# Patient Record
Sex: FEMALE | Race: WHITE
[De-identification: names, ages, dates, MRNs, and addresses within clinical notes are randomized per-mention and may not be internally consistent; named-entity substitution may affect disease eponyms.]

---

## 2017-12-22 ENCOUNTER — HOSPITAL ENCOUNTER (INPATIENT)
Dept: HOSPITAL 56 - MW.ED | Age: 27
LOS: 4 days | Discharge: HOME | DRG: 364 | End: 2017-12-26
Attending: SURGERY | Admitting: SURGERY
Payer: COMMERCIAL

## 2017-12-22 DIAGNOSIS — K21.9: ICD-10-CM

## 2017-12-22 DIAGNOSIS — Z88.2: ICD-10-CM

## 2017-12-22 DIAGNOSIS — Z88.0: ICD-10-CM

## 2017-12-22 DIAGNOSIS — T81.12XA: ICD-10-CM

## 2017-12-22 DIAGNOSIS — F17.200: ICD-10-CM
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
## 2017-12-22 DIAGNOSIS — Z79.899: ICD-10-CM

## 2017-12-22 DIAGNOSIS — T81.4XXA: ICD-10-CM

## 2017-12-22 DIAGNOSIS — L27.0: ICD-10-CM

## 2017-12-22 DIAGNOSIS — Z86.14: ICD-10-CM

## 2017-12-22 DIAGNOSIS — T36.1X5A: ICD-10-CM

## 2017-12-22 DIAGNOSIS — Z88.8: ICD-10-CM

## 2017-12-22 DIAGNOSIS — F41.9: ICD-10-CM

## 2017-12-22 DIAGNOSIS — L03.314: Primary | ICD-10-CM
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
## 2017-12-22 DIAGNOSIS — E87.1: ICD-10-CM

## 2017-12-22 DIAGNOSIS — L02.214: ICD-10-CM

## 2017-12-22 LAB
CHLORIDE SERPL-SCNC: 106 MMOL/L (ref 98–110)
CHLORIDE SERPL-SCNC: 107 MMOL/L (ref 98–110)
SODIUM SERPL-SCNC: 136 MMOL/L (ref 136–146)
SODIUM SERPL-SCNC: 137 MMOL/L (ref 136–146)

## 2017-12-22 PROCEDURE — 0J9C0ZZ DRAINAGE OF PELVIC REGION SUBCUTANEOUS TISSUE AND FASCIA, OPEN APPROACH: ICD-10-PCS | Performed by: SURGERY

## 2017-12-22 RX ADMIN — FENTANYL CITRATE PRN MCG: 50 INJECTION, SOLUTION INTRAMUSCULAR; INTRAVENOUS at 20:24

## 2017-12-22 RX ADMIN — FENTANYL CITRATE PRN MCG: 50 INJECTION, SOLUTION INTRAMUSCULAR; INTRAVENOUS at 20:33

## 2017-12-22 NOTE — PCM.SN
- Free Text/Narrative


Note: 


Patient developed fever chills and hypotension in the PACU. She was given 

dantrolene, IV tylenol, fentanyl for pain at the incision site, IV fluids and 

transferred to the ICU. Her fever on arrival to the ICU was 104. Her vitals 

improved but she remained mildly tachycardic. After the IV tylenol was begun 

her fever was 103F. I removed the dressings from the wound. Wound edges 

appeared clean. No dishwater fluid or crepitus along the wound edges. Still has 

~2-3 cm of erythema around the area. Wound packed less tightly. Stat labs 

ordered. She is on vancomycin and flagyl. Given her history of MRSA I would 

suspect this is the same bacteria causing her issues. Will follow up on labs 

and continue to monitor closely.

## 2017-12-22 NOTE — PCM.OPNOTE
- General Post-Op/Procedure Note


Date of Surgery/Procedure: 12/22/17


Operative Procedure(s): Incision and drainage left groin abscess


Findings: 


left groin abscess 2 x 2.5 x 2 cm


Pre Op Diagnosis: groin abscess


Post-Op Diagnosis: same


Anesthesia Technique: MAC


Primary Surgeon: Iwona Hernandez


EBL in mLs: 5


Condition: Stable

## 2017-12-22 NOTE — PCM.PREANE
Preanesthetic Assessment





- Anesthesia/Transfusion/Family Hx


Anesthesia History: Prior Anesthesia Without Reaction


Family History of Anesthesia Reaction: No





- Review of Systems


General: No Symptoms


Pulmonary: No Symptoms


Cardiovascular: No Symptoms


Gastrointestinal: No Symptoms


Neurological: No Symptoms


Other: Reports: None





- Physical Assessment


NPO Status Date: 12/22/17


NPO Status Time: 15:00


O2 Sat by Pulse Oximetry: 100


Respiratory Rate: 18


Vital Signs: 





 Last Vital Signs











Temp  99.0 F   12/22/17 17:00


 


Pulse  92   12/22/17 18:01


 


Resp  18   12/22/17 18:01


 


BP  96/47 L  12/22/17 18:01


 


Pulse Ox  100   12/22/17 18:01











Height: 5 ft 9 in


Weight: 57.153 kg


ASA Class: 2E


Mental Status: Alert & Oriented x3


Airway Class: Mallampati = 2


Dentition: Reports: Normal Dentition


Thyro-Mental Finger Breadths: 3


Mouth Opening Finger Breadths: 3


ROM/Head Extension: Full


Lungs: Clear to Auscultation, Normal Respiratory Effort


Cardiovascular: Regular Rate, Regular Rhythm





- Lab


Values: 





 Laboratory Last Values











WBC  9.54 K/uL (4.0-11.0)   12/22/17  14:30    


 


RBC  3.61 M/uL (4.30-5.90)  L  12/22/17  14:30    


 


Hgb  10.7 g/dL (12.0-16.0)  L  12/22/17  14:30    


 


Hct  32.9 % (36.0-46.0)  L  12/22/17  14:30    


 


MCV  91.1 fL (80.0-98.0)   12/22/17  14:30    


 


MCH  29.6 pg (27.0-32.0)   12/22/17  14:30    


 


MCHC  32.5 g/dL (31.0-37.0)   12/22/17  14:30    


 


RDW Std Deviation  50.1 fl (28.0-62.0)   12/22/17  14:30    


 


RDW Coeff of Hung  15 % (11.0-15.0)   12/22/17  14:30    


 


Plt Count  277 K/uL (150-400)   12/22/17  14:30    


 


MPV  10.10 fL (7.40-12.00)   12/22/17  14:30    


 


Neut % (Auto)  72.0 % (48.0-80.0)   12/22/17  14:30    


 


Lymph % (Auto)  19.0 % (16.0-40.0)   12/22/17  14:30    


 


Mono % (Auto)  8.0 % (0.0-15.0)   12/22/17  14:30    


 


Eos % (Auto)  0.8 % (0.0-7.0)   12/22/17  14:30    


 


Baso % (Auto)  0.2 % (0.0-1.5)   12/22/17  14:30    


 


Neut # (Auto)  6.9 K/uL (1.4-5.7)  H  12/22/17  14:30    


 


Lymph # (Auto)  1.8 K/uL (0.6-2.4)   12/22/17  14:30    


 


Mono # (Auto)  0.8 K/uL (0.0-0.8)   12/22/17  14:30    


 


Eos # (Auto)  0.1 K/uL (0.0-0.7)   12/22/17  14:30    


 


Baso # (Auto)  0.0 K/uL (0.0-0.1)   12/22/17  14:30    


 


Nucleated RBC %  0.0 /100WBC  12/22/17  14:30    


 


Nucleated RBCs #  0 K/uL  12/22/17  14:30    


 


Sodium  137 mmol/L (136-146)   12/22/17  14:30    


 


Potassium  3.7 mmol/L (3.5-5.1)   12/22/17  14:30    


 


Chloride  106 mmol/L ()   12/22/17  14:30    


 


Carbon Dioxide  24 mmol/L (21-31)   12/22/17  14:30    


 


BUN  10 mg/dL (6.0-23.0)   12/22/17  14:30    


 


Creatinine  0.7 mg/dL (0.6-1.5)   12/22/17  14:30    


 


Est Cr Clr Drug Dosing  109.88 mL/min  12/22/17  14:30    


 


Estimated GFR (MDRD)  > 60.0 ml/min  12/22/17  14:30    


 


Glucose  98 mg/dL ()   12/22/17  14:30    


 


Calcium  8.6 mg/dL (8.8-10.8)  L  12/22/17  14:30    


 


Total Bilirubin  0.3 mg/dL (0.1-1.5)   12/22/17  14:30    


 


AST  14 IU/L (5-40)   12/22/17  14:30    


 


ALT  10 IU/L (8-54)   12/22/17  14:30    


 


Alkaline Phosphatase  61  ()   12/22/17  14:30    


 


Total Protein  7.8 g/dL (6.0-8.0)   12/22/17  14:30    


 


Albumin  3.3 g/dL (3.5-5.0)  L  12/22/17  14:30    


 


Globulin  4.5 g/dL (2.0-3.5)  H  12/22/17  14:30    


 


Albumin/Globulin Ratio  0.7  (1.3-2.8)  L  12/22/17  14:30    


 


Urine Color  YELLOW   12/22/17  15:30    


 


Urine Appearance  CLEAR   12/22/17  15:30    


 


Urine pH  6.5  (5.0-8.0)   12/22/17  15:30    


 


Ur Specific Gravity  1.025  (1.001-1.035)   12/22/17  15:30    


 


Urine Protein  NEGATIVE mg/dL (NEGATIVE)   12/22/17  15:30    


 


Urine Glucose (UA)  NEGATIVE mg/dL (NEGATIVE)   12/22/17  15:30    


 


Urine Ketones  NEGATIVE mg/dL (NEGATIVE)   12/22/17  15:30    


 


Urine Occult Blood  SMALL  (NEGATIVE)  H  12/22/17  15:30    


 


Urine Nitrite  NEGATIVE  (NEGATIVE)   12/22/17  15:30    


 


Urine Bilirubin  NEGATIVE  (NEGATIVE)   12/22/17  15:30    


 


Urine Urobilinogen  1.0 EU/dL (<2.0)   12/22/17  15:30    


 


Ur Leukocyte Esterase  SMALL  (NEGATIVE)   12/22/17  15:30    


 


Urine RBC  0-2  (0-2/HPF)   12/22/17  15:30    


 


Urine WBC  3-5  (0-5/HPF)   12/22/17  15:30    


 


Ur Epithelial Cells  OCCASIONAL  (NONE-FEW)   12/22/17  15:30    


 


Urine Bacteria  FEW  (NEGATIVE)   12/22/17  15:30    


 


Urine Mucus  LIGHT  (NONE-MOD)   12/22/17  15:30    


 


Urinalysis Comment     12/22/17  15:30    


 


Urine HCG, Qual  NEGATIVE  (NEGATIVE)   12/22/17  15:30    














- Allergies


Allergies/Adverse Reactions: 


 Allergies











Allergy/AdvReac Type Severity Reaction Status Date / Time


 


naproxen Allergy  Rash Verified 12/22/17 14:06


 


Penicillins Allergy  Rash Verified 12/22/17 14:06


 


Sulfa (Sulfonamide Allergy  Difficulty Verified 12/22/17 14:06





Antibiotics)   Breathing  














- Acknowledgements


Anesthesia Type Planned: General Anesthesia, MAC


Pt an Appropriate Candidate for the Planned Anesthesia: Yes


Alternatives and Risks of Anesthesia Discussed w Pt/Guardian: Yes


Pt/Guardian Understands and Agrees with Anesthesia Plan: Yes





PreAnesthesia Questionnaire


HEENT History: Reports: Impaired Vision, Other (See Below)


Other HEENT History: enlarged tonsil


Cardiovascular History: Reports: None


Respiratory History: Reports: Other (See Below) (Smoker)


Gastrointestinal History: Reports: None


Genitourinary History: Reports: UTI, Recurrent


OB/GYN History: Reports: Pregnancy


Other OB/BYN History: Two prior pregnancies. Both children alive and healthy.


Musculoskeletal History: Reports: None


Neurological History: Reports: None


Psychiatric History: Reports: Anxiety


Endocrine/Metabolic History: Reports: None


Hematologic History: Reports: None


Immunologic History: Reports: Other (See Below)


Oncologic (Cancer) History: Reports: None


Dermatologic History: Reports: Other (See Below)


Other Dermatologic History: hand and feet rashes from Naproxen from Lupus





- Infectious Disease History


Infectious Disease History: Reports: Chicken Pox





- Past Surgical History


HEENT Surgical History: Reports: Other (See Below) ([place] teeth extraction)


Dermatological Surgical History: Reports: Skin Biopsy





- SUBSTANCE USE


Smoking Status *Q: Current Every Day Smoker (0.25 PPD)


Tobacco Use Within Last Twelve Months: Cigarettes


Second Hand Smoke Exposure: Yes


Recreational Drug Use History: No





- HOME MEDS


Home Medications: 


 Home Meds





Hydroxychloroquine Sulfate [Plaquenil] 200 mg PO DAILY 12/22/17 [History]











- CURRENT (IN HOUSE) MEDS


Current Meds: 





 Current Medications





Sodium Chloride (Normal Saline)  1,000 mls @ 999 mls/hr IV STAT ONE


   Stop: 12/22/17 19:07





Discontinued Medications





Sodium Chloride (Normal Saline)  1,000 mls @ 999 mls/hr IV STAT ONE


   Stop: 12/22/17 15:21


   Last Admin: 12/22/17 14:45 Dose:  999 mls/hr


Iopamidol (Isovue Multipack-370 (76%))  100 ml IVPUSH ONETIME STA


   Stop: 12/22/17 16:05


   Last Admin: 12/22/17 16:05 Dose:  100 ml


Morphine Sulfate (Morphine)  2 mg IVPUSH ONETIME ONE


   Stop: 12/22/17 14:23


   Last Admin: 12/22/17 14:45 Dose:  2 mg


Morphine Sulfate (Morphine)  2 mg IVPUSH ONETIME ONE


   Stop: 12/22/17 17:37


   Last Admin: 12/22/17 17:56 Dose:  2 mg

## 2017-12-22 NOTE — EDM.PDOC
ED HPI GENERAL MEDICAL PROBLEM





- General


Chief Complaint: General


Stated Complaint: FLUID BUILD UP GROIN


Time Seen by Provider: 12/22/17 13:54


Source of Information: Reports: Patient


History Limitations: Reports: No Limitations





- History of Present Illness


INITIAL COMMENTS - FREE TEXT/NARRATIVE: 


HISTORY AND PHYSICAL:





History of present illness:


Patient is a 27-year-old female who presents to the emergency room today with 

complaints of an unresolved abscess. States she has had a large abscess to the 

left groin crossing onto the mons pubis x 2 weeks. She has seen Dr. Seth who 

placed her on Clindamycin and Bactrim, which she just finished today. She had 

an ultrasound which was performed yesterday. 


US shows an indeterminate 3.51.5 cm soft tissue structure deep to marked 

subcutaneous edema in the left groin. The first sound is recommended performing 

a CT with IV contrast for further evaluation.


The abscess is firm and tender to palpation. Denies any fever or chills. Denies 

any change to her urinary or bowel habits. Denies any chest pain, shortness of 

breath, abdominal pain.


Patient has a past medical history of MRSA vulvitis and lupus. 





Review of systems: 


As per history of present illness and below otherwise all systems reviewed and 

negative.





Past medical history: 


As per history of present illness and as reviewed below otherwise 

noncontributory.





Surgical history: 


As per history of present illness and as reviewed below otherwise 

noncontributory.





Social history: 


No reported history of drug or alcohol abuse.





Family history: 


As per history of present illness and as reviewed below otherwise 

noncontributory.





Physical exam:


HEENT: Atraumatic, normocephalic, pupils reactive, negative for conjunctival 

pallor or scleral icterus, mucous membranes moist, throat clear, neck supple, 

nontender, trachea midline.


Lungs: Clear to auscultation, breath sounds equal bilaterally, chest nontender.


Heart: S1S2, regular rate and rhythm - no overt murmurs


Abdomen: Soft, nondistended, nontender. Negative for masses or 

hepatosplenomegaly. Negative for costovertebral tenderness.


Pelvis: Stable nontender.


Genitourinary: Deferred.


Rectal: Deferred.


Extremities: Atraumatic, negative for cords or calf pain. Neurovascular 

unremarkable.


Skin: Patient has a localized area of redness to the left going crossing onto 

the mons pubis. Does not extend into the genitalia. Tender to palpation. 

Nonfluctuant. No induration. Warm to touch.


Neuro: Awake, alert, oriented. Cranial nerves II through XII unremarkable. 

Cerebellum unremarkable. Motor and sensory unremarkable throughout. Exam 

nonfocal.





1700-Patient is still waiting for CT at this time. She states she is 

comfortable and declines any need for pain medication at this time. Vital signs 

are stable.


1800- CT results have returned. 3.7cm left inguinal peripherally enhancing 

collection, suggestive of an abscess. Underlying enlarged left inguinal lymph 

nodes with partially necrotic right perirectal lymph node. Dr. Hernandez was 

consulted on this case. She states she will come in to see the patient. Patient 

is made aware.


1805- IV fluid and repeat morphine given at this time. Patient has been nothing 

by mouth since with small amount of liquid at 3 PM.


1830- Dr Hernandez is here to see/evaluate patient.


1850- Patient will be admitted for observation for same day surgery, per Dr Hernandez





Diagnostics:


CBC, CMP, BC x 2, CT abdomen/pelvis





Therapeutics:


IV fluids, Morphine





Impression: 


Abscess





Plan:


Observation admission to Same Day Surgery per Dr. Hernandez





Definitive disposition and diagnosis as appropriate pending reevaluation and 

review of above.





  ** Left Groin


Pain Score (Numeric/FACES): 9





- Related Data


 Allergies











Allergy/AdvReac Type Severity Reaction Status Date / Time


 


naproxen Allergy  Rash Verified 12/22/17 14:06


 


Penicillins Allergy  Rash Verified 12/22/17 14:06


 


Sulfa (Sulfonamide Allergy  Difficulty Verified 12/22/17 14:06





Antibiotics)   Breathing  











Home Meds: 


 Home Meds





Hydroxychloroquine Sulfate [Plaquenil] 200 mg PO DAILY 12/22/17 [History]











Past Medical History


HEENT History: Reports: Impaired Vision, Other (See Below)


Other HEENT History: enlarged tonsil


Genitourinary History: Reports: UTI, Recurrent


OB/GYN History: Reports: Pregnancy


Other OB/BYN History: Two prior pregnancies. Both children alive and healthy.


Psychiatric History: Reports: Anxiety


Immunologic History: Reports: Other (See Below)


Dermatologic History: Reports: Other (See Below)


Other Dermatologic History: hand and feet rashes from Naproxen from Lupus





- Infectious Disease History


Infectious Disease History: Reports: Chicken Pox





- Past Surgical History


Dermatological Surgical History: Reports: Skin Biopsy





Social & Family History





- Family History


Family Medical History: Noncontributory


Other Oncologic Family History: Father and maternal grandfather had cancer but 

patient cannot recall which kind.





- Tobacco Use


Smoking Status *Q: Current Every Day Smoker


Years of Tobacco use: 5


Packs/Tins Daily: 0.3


Used Tobacco, but Quit: No


Month Tobacco Last Used: November 2015


Second Hand Smoke Exposure: Yes





- Caffeine Use


Caffeine Use: Reports: Soda


Caffeine Use Comment: 2 cups per day





- Recreational Drug Use


Recreational Drug Use: No


Drug Use in Last 12 Months: Yes





ED ROS GENERAL





- Review of Systems


Review Of Systems: ROS reveals no pertinent complaints other than HPI.





ED EXAM, GENERAL





- Physical Exam


Exam: See Below (See dictation)





Course





- Vital Signs


Last Recorded V/S: 


 Last Vital Signs











Temp  99.0 F   12/22/17 17:00


 


Pulse  92   12/22/17 18:01


 


Resp  18   12/22/17 18:01


 


BP  96/47 L  12/22/17 18:01


 


Pulse Ox  100   12/22/17 18:01














- Orders/Labs/Meds


Orders: 


 Active Orders 24 hr











 Category Date Time Status


 


 Admission Status [Patient Status] [ADT] Stat ADT  12/22/17 18:52 Ordered


 


 Abdomen Pelvis w Cont [CT] Stat Exams  12/22/17 14:20 Taken


 


 CULTURE BLOOD [BC] Stat Lab  12/22/17 14:30 Received


 


 CULTURE BLOOD [BC] Stat Lab  12/22/17 14:34 Received


 


 Sodium Chloride 0.9% [Normal Saline] 1,000 ml Med  12/22/17 18:07 Active





 IV STAT   


 


 Blood Culture x2 Reflex Set [OM.PC] Stat Oth  12/22/17 14:21 Ordered








 Medication Orders





Sodium Chloride (Normal Saline)  1,000 mls @ 999 mls/hr IV STAT ONE


   Stop: 12/22/17 19:07








Labs: 


 Laboratory Tests











  12/22/17 12/22/17 12/22/17 Range/Units





  14:30 14:30 15:30 


 


WBC  9.54    (4.0-11.0)  K/uL


 


RBC  3.61 L    (4.30-5.90)  M/uL


 


Hgb  10.7 L    (12.0-16.0)  g/dL


 


Hct  32.9 L    (36.0-46.0)  %


 


MCV  91.1    (80.0-98.0)  fL


 


MCH  29.6    (27.0-32.0)  pg


 


MCHC  32.5    (31.0-37.0)  g/dL


 


RDW Std Deviation  50.1    (28.0-62.0)  fl


 


RDW Coeff of Hung  15    (11.0-15.0)  %


 


Plt Count  277    (150-400)  K/uL


 


MPV  10.10    (7.40-12.00)  fL


 


Neut % (Auto)  72.0    (48.0-80.0)  %


 


Lymph % (Auto)  19.0    (16.0-40.0)  %


 


Mono % (Auto)  8.0    (0.0-15.0)  %


 


Eos % (Auto)  0.8    (0.0-7.0)  %


 


Baso % (Auto)  0.2    (0.0-1.5)  %


 


Neut # (Auto)  6.9 H    (1.4-5.7)  K/uL


 


Lymph # (Auto)  1.8    (0.6-2.4)  K/uL


 


Mono # (Auto)  0.8    (0.0-0.8)  K/uL


 


Eos # (Auto)  0.1    (0.0-0.7)  K/uL


 


Baso # (Auto)  0.0    (0.0-0.1)  K/uL


 


Nucleated RBC %  0.0    /100WBC


 


Nucleated RBCs #  0    K/uL


 


Sodium   137   (136-146)  mmol/L


 


Potassium   3.7   (3.5-5.1)  mmol/L


 


Chloride   106   ()  mmol/L


 


Carbon Dioxide   24   (21-31)  mmol/L


 


BUN   10   (6.0-23.0)  mg/dL


 


Creatinine   0.7   (0.6-1.5)  mg/dL


 


Est Cr Clr Drug Dosing   109.88   mL/min


 


Estimated GFR (MDRD)   > 60.0   ml/min


 


Glucose   98   ()  mg/dL


 


Calcium   8.6 L   (8.8-10.8)  mg/dL


 


Total Bilirubin   0.3   (0.1-1.5)  mg/dL


 


AST   14   (5-40)  IU/L


 


ALT   10   (8-54)  IU/L


 


Alkaline Phosphatase   61   ()  


 


Total Protein   7.8   (6.0-8.0)  g/dL


 


Albumin   3.3 L   (3.5-5.0)  g/dL


 


Globulin   4.5 H   (2.0-3.5)  g/dL


 


Albumin/Globulin Ratio   0.7 L   (1.3-2.8)  


 


Urine Color     


 


Urine Appearance     


 


Urine pH     (5.0-8.0)  


 


Ur Specific Gravity     (1.001-1.035)  


 


Urine Protein     (NEGATIVE)  mg/dL


 


Urine Glucose (UA)     (NEGATIVE)  mg/dL


 


Urine Ketones     (NEGATIVE)  mg/dL


 


Urine Occult Blood     (NEGATIVE)  


 


Urine Nitrite     (NEGATIVE)  


 


Urine Bilirubin     (NEGATIVE)  


 


Urine Urobilinogen     (<2.0)  EU/dL


 


Ur Leukocyte Esterase     (NEGATIVE)  


 


Urine RBC     (0-2/HPF)  


 


Urine WBC     (0-5/HPF)  


 


Ur Epithelial Cells     (NONE-FEW)  


 


Urine Bacteria     (NEGATIVE)  


 


Urine Mucus     (NONE-MOD)  


 


Urinalysis Comment     


 


Urine HCG, Qual    NEGATIVE  (NEGATIVE)  














  12/22/17 Range/Units





  15:30 


 


WBC   (4.0-11.0)  K/uL


 


RBC   (4.30-5.90)  M/uL


 


Hgb   (12.0-16.0)  g/dL


 


Hct   (36.0-46.0)  %


 


MCV   (80.0-98.0)  fL


 


MCH   (27.0-32.0)  pg


 


MCHC   (31.0-37.0)  g/dL


 


RDW Std Deviation   (28.0-62.0)  fl


 


RDW Coeff of Hung   (11.0-15.0)  %


 


Plt Count   (150-400)  K/uL


 


MPV   (7.40-12.00)  fL


 


Neut % (Auto)   (48.0-80.0)  %


 


Lymph % (Auto)   (16.0-40.0)  %


 


Mono % (Auto)   (0.0-15.0)  %


 


Eos % (Auto)   (0.0-7.0)  %


 


Baso % (Auto)   (0.0-1.5)  %


 


Neut # (Auto)   (1.4-5.7)  K/uL


 


Lymph # (Auto)   (0.6-2.4)  K/uL


 


Mono # (Auto)   (0.0-0.8)  K/uL


 


Eos # (Auto)   (0.0-0.7)  K/uL


 


Baso # (Auto)   (0.0-0.1)  K/uL


 


Nucleated RBC %   /100WBC


 


Nucleated RBCs #   K/uL


 


Sodium   (136-146)  mmol/L


 


Potassium   (3.5-5.1)  mmol/L


 


Chloride   ()  mmol/L


 


Carbon Dioxide   (21-31)  mmol/L


 


BUN   (6.0-23.0)  mg/dL


 


Creatinine   (0.6-1.5)  mg/dL


 


Est Cr Clr Drug Dosing   mL/min


 


Estimated GFR (MDRD)   ml/min


 


Glucose   ()  mg/dL


 


Calcium   (8.8-10.8)  mg/dL


 


Total Bilirubin   (0.1-1.5)  mg/dL


 


AST   (5-40)  IU/L


 


ALT   (8-54)  IU/L


 


Alkaline Phosphatase   ()  


 


Total Protein   (6.0-8.0)  g/dL


 


Albumin   (3.5-5.0)  g/dL


 


Globulin   (2.0-3.5)  g/dL


 


Albumin/Globulin Ratio   (1.3-2.8)  


 


Urine Color  YELLOW  


 


Urine Appearance  CLEAR  


 


Urine pH  6.5  (5.0-8.0)  


 


Ur Specific Gravity  1.025  (1.001-1.035)  


 


Urine Protein  NEGATIVE  (NEGATIVE)  mg/dL


 


Urine Glucose (UA)  NEGATIVE  (NEGATIVE)  mg/dL


 


Urine Ketones  NEGATIVE  (NEGATIVE)  mg/dL


 


Urine Occult Blood  SMALL H  (NEGATIVE)  


 


Urine Nitrite  NEGATIVE  (NEGATIVE)  


 


Urine Bilirubin  NEGATIVE  (NEGATIVE)  


 


Urine Urobilinogen  1.0  (<2.0)  EU/dL


 


Ur Leukocyte Esterase  SMALL  (NEGATIVE)  


 


Urine RBC  0-2  (0-2/HPF)  


 


Urine WBC  3-5  (0-5/HPF)  


 


Ur Epithelial Cells  OCCASIONAL  (NONE-FEW)  


 


Urine Bacteria  FEW  (NEGATIVE)  


 


Urine Mucus  LIGHT  (NONE-MOD)  


 


Urinalysis Comment    


 


Urine HCG, Qual   (NEGATIVE)  











Meds: 


Medications











Generic Name Dose Route Start Last Admin





  Trade Name Freq  PRN Reason Stop Dose Admin


 


Sodium Chloride  1,000 mls @ 999 mls/hr  12/22/17 18:07  





  Normal Saline  IV  12/22/17 19:07  





  STAT ONE   














Discontinued Medications














Generic Name Dose Route Start Last Admin





  Trade Name Freq  PRN Reason Stop Dose Admin


 


Sodium Chloride  1,000 mls @ 999 mls/hr  12/22/17 14:21  12/22/17 14:45





  Normal Saline  IV  12/22/17 15:21  999 mls/hr





  STAT ONE   Administration


 


Iopamidol  100 ml  12/22/17 16:04  12/22/17 16:05





  Isovue Multipack-370 (76%)  IVPUSH  12/22/17 16:05  100 ml





  ONETIME STA   Administration


 


Morphine Sulfate  2 mg  12/22/17 14:22  12/22/17 14:45





  Morphine  IVPUSH  12/22/17 14:23  2 mg





  ONETIME ONE   Administration


 


Morphine Sulfate  2 mg  12/22/17 17:36  12/22/17 17:56





  Morphine  IVPUSH  12/22/17 17:37  2 mg





  ONETIME ONE   Administration














Departure





- Departure


Time of Disposition: 18:57


Disposition: Refer to Observation


Clinical Impression: 


 Abscess








- Discharge Information


Referrals: 


Aurelio eSth MD [Primary Care Provider] - 


Forms:  ED Department Discharge





- My Orders


Last 24 Hours: 


My Active Orders





12/22/17 14:20


Abdomen Pelvis w Cont [CT] Stat 





12/22/17 14:21


Blood Culture x2 Reflex Set [OM.PC] Stat 





12/22/17 14:30


CULTURE BLOOD [BC] Stat 





12/22/17 14:34


CULTURE BLOOD [BC] Stat 





12/22/17 18:07


Sodium Chloride 0.9% [Normal Saline] 1,000 ml IV STAT 





12/22/17 18:52


Admission Status [Patient Status] [ADT] Stat 














- Assessment/Plan


Last 24 Hours: 


My Active Orders





12/22/17 14:20


Abdomen Pelvis w Cont [CT] Stat 





12/22/17 14:21


Blood Culture x2 Reflex Set [OM.PC] Stat 





12/22/17 14:30


CULTURE BLOOD [BC] Stat 





12/22/17 14:34


CULTURE BLOOD [BC] Stat 





12/22/17 18:07


Sodium Chloride 0.9% [Normal Saline] 1,000 ml IV STAT 





12/22/17 18:52


Admission Status [Patient Status] [ADT] Stat

## 2017-12-22 NOTE — PCM.HP
H&P History of Present Illness





- General


Date of Service: 12/22/17


Admit Problem/Dx: 


 Admission Diagnosis/Problem





Admission Diagnosis/Problem      Abscess








Source of Information: Patient


History Limitations: Reports: No Limitations





- History of Present Illness


Initial Comments - Free Text/Narative: 


Patient is a 27 year old female who presented to the ED with 2 week history of 

right inguinal swelling and pain. She was on clindamycin when she developed an 

abscess in the area. She failed outpatient treatment and came to the ED. CT 

showed a ~4 cm abscess in the right groin with lymphadenopathy. She has a 

history of MRSA. 


Onset of Symptoms: Reports: Gradual


Quality: Reports: Ache, Pressure, Sharp, Stabbing


Severity: Severe


Improves with: Reports: None


Worsens with: Reports: Movement


Associated Symptoms: Denies: Fever/Chills, Nausea/Vomiting


  ** Left Groin


Pain Score (Numeric/FACES): 9





- Related Data


Allergies/Adverse Reactions: 


 Allergies











Allergy/AdvReac Type Severity Reaction Status Date / Time


 


naproxen Allergy  Rash Verified 12/22/17 14:06


 


Penicillins Allergy  Rash Verified 12/22/17 14:06


 


Sulfa (Sulfonamide Allergy  Difficulty Verified 12/22/17 14:06





Antibiotics)   Breathing  











Home Medications: 


 Home Meds





Hydroxychloroquine Sulfate [Plaquenil] 200 mg PO DAILY 12/22/17 [History]











Past Medical History


HEENT History: Reports: Impaired Vision, Other (See Below)


Other HEENT History: enlarged tonsil


Cardiovascular History: Reports: None


Respiratory History: Reports: Other (See Below) (Smoker)


Gastrointestinal History: Reports: None


Genitourinary History: Reports: UTI, Recurrent


OB/GYN History: Reports: Pregnancy


Other OB/BYN History: Two prior pregnancies. Both children alive and healthy.


Musculoskeletal History: Reports: None


Neurological History: Reports: None


Psychiatric History: Reports: Anxiety


Endocrine/Metabolic History: Reports: None


Hematologic History: Reports: None


Immunologic History: Reports: Other (See Below)


Oncologic (Cancer) History: Reports: None


Dermatologic History: Reports: Other (See Below)


Other Dermatologic History: hand and feet rashes from Naproxen from Lupus





- Infectious Disease History


Infectious Disease History: Reports: Chicken Pox





- Past Surgical History


HEENT Surgical History: Reports: Other (See Below) ([place] teeth extraction)


Dermatological Surgical History: Reports: Skin Biopsy





Social & Family History





- Family History


Family Medical History: Noncontributory


Other Oncologic Family History: Father and maternal grandfather had cancer but 

patient cannot recall which kind.





- Tobacco Use


Smoking Status *Q: Current Every Day Smoker (0.25 PPD)


Years of Tobacco use: 5


Packs/Tins Daily: 0.3


Used Tobacco, but Quit: No


Month Tobacco Last Used: November 2015


Second Hand Smoke Exposure: Yes





- Caffeine Use


Caffeine Use: Reports: Soda


Caffeine Use Comment: 2 cups per day





- Recreational Drug Use


Recreational Drug Use: No


Drug Use in Last 12 Months: Yes





H&P Review of Systems





- Review of Systems:


Review Of Systems: ROS reveals no pertinent complaints other than HPI.





Exam





- Exam


Exam: See Below





- Vital Signs


Vital Signs: 


 Last Vital Signs











Temp  37.4 C   12/22/17 19:50


 


Pulse  74   12/22/17 20:16


 


Resp  20   12/22/17 20:16


 


BP  88/40 L  12/22/17 20:16


 


Pulse Ox  100   12/22/17 20:16











Weight: 57.153 kg





- Exam


General: Alert, Oriented


Lungs: Clear to Auscultation, Normal Respiratory Effort


Cardiovascular: Regular Rate, Regular Rhythm


GI/Abdominal Exam: Soft, Non-Tender, No Distention, Other (Large fluctuant mass 

in left groin associated with cellulitis. )


Rectal (Female) Exam: Normal Exam


Extremities: Normal Inspection, Normal Range of Motion


Skin: Warm, Dry, Intact





- Patient Data


Lab Results Last 24 hrs: 


 Laboratory Results - last 24 hr











  12/22/17 12/22/17 12/22/17 Range/Units





  14:30 14:30 15:30 


 


WBC  9.54    (4.0-11.0)  K/uL


 


RBC  3.61 L    (4.30-5.90)  M/uL


 


Hgb  10.7 L    (12.0-16.0)  g/dL


 


Hct  32.9 L    (36.0-46.0)  %


 


MCV  91.1    (80.0-98.0)  fL


 


MCH  29.6    (27.0-32.0)  pg


 


MCHC  32.5    (31.0-37.0)  g/dL


 


RDW Std Deviation  50.1    (28.0-62.0)  fl


 


RDW Coeff of Hung  15    (11.0-15.0)  %


 


Plt Count  277    (150-400)  K/uL


 


MPV  10.10    (7.40-12.00)  fL


 


Neut % (Auto)  72.0    (48.0-80.0)  %


 


Lymph % (Auto)  19.0    (16.0-40.0)  %


 


Mono % (Auto)  8.0    (0.0-15.0)  %


 


Eos % (Auto)  0.8    (0.0-7.0)  %


 


Baso % (Auto)  0.2    (0.0-1.5)  %


 


Neut # (Auto)  6.9 H    (1.4-5.7)  K/uL


 


Lymph # (Auto)  1.8    (0.6-2.4)  K/uL


 


Mono # (Auto)  0.8    (0.0-0.8)  K/uL


 


Eos # (Auto)  0.1    (0.0-0.7)  K/uL


 


Baso # (Auto)  0.0    (0.0-0.1)  K/uL


 


Nucleated RBC %  0.0    /100WBC


 


Nucleated RBCs #  0    K/uL


 


Sodium   137   (136-146)  mmol/L


 


Potassium   3.7   (3.5-5.1)  mmol/L


 


Chloride   106   ()  mmol/L


 


Carbon Dioxide   24   (21-31)  mmol/L


 


BUN   10   (6.0-23.0)  mg/dL


 


Creatinine   0.7   (0.6-1.5)  mg/dL


 


Est Cr Clr Drug Dosing   109.88   mL/min


 


Estimated GFR (MDRD)   > 60.0   ml/min


 


Glucose   98   ()  mg/dL


 


Calcium   8.6 L   (8.8-10.8)  mg/dL


 


Total Bilirubin   0.3   (0.1-1.5)  mg/dL


 


AST   14   (5-40)  IU/L


 


ALT   10   (8-54)  IU/L


 


Alkaline Phosphatase   61   ()  


 


Total Protein   7.8   (6.0-8.0)  g/dL


 


Albumin   3.3 L   (3.5-5.0)  g/dL


 


Globulin   4.5 H   (2.0-3.5)  g/dL


 


Albumin/Globulin Ratio   0.7 L   (1.3-2.8)  


 


Urine Color     


 


Urine Appearance     


 


Urine pH     (5.0-8.0)  


 


Ur Specific Gravity     (1.001-1.035)  


 


Urine Protein     (NEGATIVE)  mg/dL


 


Urine Glucose (UA)     (NEGATIVE)  mg/dL


 


Urine Ketones     (NEGATIVE)  mg/dL


 


Urine Occult Blood     (NEGATIVE)  


 


Urine Nitrite     (NEGATIVE)  


 


Urine Bilirubin     (NEGATIVE)  


 


Urine Urobilinogen     (<2.0)  EU/dL


 


Ur Leukocyte Esterase     (NEGATIVE)  


 


Urine RBC     (0-2/HPF)  


 


Urine WBC     (0-5/HPF)  


 


Ur Epithelial Cells     (NONE-FEW)  


 


Urine Bacteria     (NEGATIVE)  


 


Urine Mucus     (NONE-MOD)  


 


Urinalysis Comment     


 


Urine HCG, Qual    NEGATIVE  (NEGATIVE)  














  12/22/17 Range/Units





  15:30 


 


WBC   (4.0-11.0)  K/uL


 


RBC   (4.30-5.90)  M/uL


 


Hgb   (12.0-16.0)  g/dL


 


Hct   (36.0-46.0)  %


 


MCV   (80.0-98.0)  fL


 


MCH   (27.0-32.0)  pg


 


MCHC   (31.0-37.0)  g/dL


 


RDW Std Deviation   (28.0-62.0)  fl


 


RDW Coeff of Hung   (11.0-15.0)  %


 


Plt Count   (150-400)  K/uL


 


MPV   (7.40-12.00)  fL


 


Neut % (Auto)   (48.0-80.0)  %


 


Lymph % (Auto)   (16.0-40.0)  %


 


Mono % (Auto)   (0.0-15.0)  %


 


Eos % (Auto)   (0.0-7.0)  %


 


Baso % (Auto)   (0.0-1.5)  %


 


Neut # (Auto)   (1.4-5.7)  K/uL


 


Lymph # (Auto)   (0.6-2.4)  K/uL


 


Mono # (Auto)   (0.0-0.8)  K/uL


 


Eos # (Auto)   (0.0-0.7)  K/uL


 


Baso # (Auto)   (0.0-0.1)  K/uL


 


Nucleated RBC %   /100WBC


 


Nucleated RBCs #   K/uL


 


Sodium   (136-146)  mmol/L


 


Potassium   (3.5-5.1)  mmol/L


 


Chloride   ()  mmol/L


 


Carbon Dioxide   (21-31)  mmol/L


 


BUN   (6.0-23.0)  mg/dL


 


Creatinine   (0.6-1.5)  mg/dL


 


Est Cr Clr Drug Dosing   mL/min


 


Estimated GFR (MDRD)   ml/min


 


Glucose   ()  mg/dL


 


Calcium   (8.8-10.8)  mg/dL


 


Total Bilirubin   (0.1-1.5)  mg/dL


 


AST   (5-40)  IU/L


 


ALT   (8-54)  IU/L


 


Alkaline Phosphatase   ()  


 


Total Protein   (6.0-8.0)  g/dL


 


Albumin   (3.5-5.0)  g/dL


 


Globulin   (2.0-3.5)  g/dL


 


Albumin/Globulin Ratio   (1.3-2.8)  


 


Urine Color  YELLOW  


 


Urine Appearance  CLEAR  


 


Urine pH  6.5  (5.0-8.0)  


 


Ur Specific Gravity  1.025  (1.001-1.035)  


 


Urine Protein  NEGATIVE  (NEGATIVE)  mg/dL


 


Urine Glucose (UA)  NEGATIVE  (NEGATIVE)  mg/dL


 


Urine Ketones  NEGATIVE  (NEGATIVE)  mg/dL


 


Urine Occult Blood  SMALL H  (NEGATIVE)  


 


Urine Nitrite  NEGATIVE  (NEGATIVE)  


 


Urine Bilirubin  NEGATIVE  (NEGATIVE)  


 


Urine Urobilinogen  1.0  (<2.0)  EU/dL


 


Ur Leukocyte Esterase  SMALL  (NEGATIVE)  


 


Urine RBC  0-2  (0-2/HPF)  


 


Urine WBC  3-5  (0-5/HPF)  


 


Ur Epithelial Cells  OCCASIONAL  (NONE-FEW)  


 


Urine Bacteria  FEW  (NEGATIVE)  


 


Urine Mucus  LIGHT  (NONE-MOD)  


 


Urinalysis Comment    


 


Urine HCG, Qual   (NEGATIVE)  











Result Diagrams: 


 12/22/17 14:30





 12/22/17 14:30





*Q Meaningful Use (ADM)





- VTE *Q


VTE Criteria *Q: 








- Stroke *Q


Stroke Criteria *Q: 








- AMI *Q


AMI Criteria *Q: 








- Problem List


(1) Cellulitis


SNOMED Code(s): 532247350


   ICD Code: L03.90 - CELLULITIS, UNSPECIFIED   Status: Acute   Current Visit: 

Yes   





(2) Abscess


SNOMED Code(s): 164092876


   ICD Code: L02.91 - CUTANEOUS ABSCESS, UNSPECIFIED   Status: Acute   Current 

Visit: Yes   


Problem List Initiated/Reviewed/Updated: Yes


Orders Last 24hrs: 


 Active Orders 24 hr











 Category Date Time Status


 


 Patient Status [ADT] Routine ADT  12/22/17 19:58 Active


 


 Bradycardia-Neuroaxis [employer] [RC] ROUTINE Care  12/22/17 20:21 Active


 


 Communication Order [RC] PER UNIT ROUTINE Care  12/22/17 20:05 Active


 


 Hypertension-Neuroaxis [employer] [RC] ROUTINE Care  12/22/17 20:21 Active


 


 Hypotension-Neuroaxis [employer] [RC] ROUTINE Care  12/22/17 20:21 Active


 


 Intake and Output [RC] QSHIFT Care  12/22/17 19:59 Active


 


 Oxygen Therapy [RC] PRN Care  12/22/17 19:58 Active


 


 RT Incentive Spirometry [RC] ASDIRECTED Care  12/22/17 19:58 Active


 


 Up ad Joie [RC] ASDIRECTED Care  12/22/17 19:58 Active


 


 Vital Signs [RC] PER UNIT ROUTINE Care  12/22/17 19:58 Active


 


 Regular Diet [DIET] Diet  12/22/17 Breakfast Active


 


 Abdomen Pelvis w Cont [CT] Stat Exams  12/22/17 14:20 Taken


 


 CBC W/O DIFF,HEMOGRAM [HEME] AM Lab  12/23/17 05:11 Ordered


 


 CULTURE ANAEROBIC [RM] Routine Lab  12/22/17 19:37 Received


 


 CULTURE BLOOD [BC] Stat Lab  12/22/17 14:30 Received


 


 CULTURE BLOOD [BC] Stat Lab  12/22/17 14:34 Received


 


 CULTURE WOUND [RM] Routine Lab  12/22/17 19:37 Received


 


 GRAM STAIN [RM] Routine Lab  12/22/17 19:37 Received


 


 Acetaminophen/HYDROcodone [Norco 325-5 MG] Med  12/22/17 19:58 Active





 2 tab PO Q4H PRN   


 


 HYDROmorphone [Dilaudid] Med  12/22/17 19:58 Active





 0.5 mg IVPUSH Q1H PRN   


 


 Lactated Ringers [Ringers, Lactated] 1,000 ml Med  12/22/17 19:00 Active





 IV ASDIRECTED   


 


 Ondansetron [Zofran] Med  12/22/17 19:58 Active





 4 mg IVPUSH Q6H PRN   


 


 fentaNYL [Sublimaze] Med  12/22/17 20:21 Ordered





 50 mcg IVPUSH Q5M PRN   


 


 Blood Culture x2 Reflex Set [OM.PC] Stat Oth  12/22/17 14:21 Ordered


 


 Resuscitation Status Routine Resus Stat  12/22/17 19:58 Ordered








 Medication Orders





Hydrocodone Bitart/Acetaminophen (Norco 325-5 Mg)  2 tab PO Q4H PRN


   PRN Reason: Pain (moderate 4-6)


Fentanyl (Sublimaze)  50 mcg IVPUSH Q5M PRN


   PRN Reason: Pain (severe 7-10)


   Stop: 12/23/17 20:21


Hydromorphone HCl (Dilaudid)  0.5 mg IVPUSH Q1H PRN


   PRN Reason: Pain (severe 7-10)


Lactated Ringer's (Ringers, Lactated)  1,000 mls @ 150 mls/hr IV ASDIRECTED MANUELA


   Last Admin: 12/22/17 19:20  Dose: 150 mls/hr


Ondansetron HCl (Zofran)  4 mg IVPUSH Q6H PRN


   PRN Reason: Nausea/Vomiting








Assessment/Plan Comment:: 


We discussed the need for an incision and drainage of this abscess. It is too 

large and too painful to drain in the ED. We discussed the procedure, expected 

breana-operative course and risks including bleeding infection or damage to 

surrounding structures. She verbalized understanding and wishes to proceed. Her 

rectal exam was normal. CT showed a breana-rectal lymph node that was partially 

necrotic. I will treat her overnight with antibiotics. I feel her 

lymphadenopathy is all reactive. IV antibiotics overnight.

## 2017-12-23 LAB
CHLORIDE SERPL-SCNC: 108 MMOL/L (ref 98–110)
CHLORIDE SERPL-SCNC: 111 MMOL/L (ref 98–110)
SODIUM SERPL-SCNC: 137 MMOL/L (ref 136–146)
SODIUM SERPL-SCNC: 138 MMOL/L (ref 136–146)

## 2017-12-23 PROCEDURE — 05HM33Z INSERTION OF INFUSION DEVICE INTO RIGHT INTERNAL JUGULAR VEIN, PERCUTANEOUS APPROACH: ICD-10-PCS | Performed by: SURGERY

## 2017-12-23 RX ADMIN — HYDROCORTISONE SODIUM SUCCINATE SCH MG: 100 INJECTION, POWDER, FOR SOLUTION INTRAMUSCULAR; INTRAVENOUS at 13:48

## 2017-12-23 RX ADMIN — HYDROCODONE BITARTRATE AND ACETAMINOPHEN PRN TAB: 5; 325 TABLET ORAL at 19:28

## 2017-12-23 RX ADMIN — HYDROCODONE BITARTRATE AND ACETAMINOPHEN PRN TAB: 5; 325 TABLET ORAL at 01:45

## 2017-12-23 RX ADMIN — HYDROCODONE BITARTRATE AND ACETAMINOPHEN PRN TAB: 5; 325 TABLET ORAL at 10:11

## 2017-12-23 RX ADMIN — HYDROCODONE BITARTRATE AND ACETAMINOPHEN PRN TAB: 5; 325 TABLET ORAL at 23:38

## 2017-12-23 RX ADMIN — HYDROCORTISONE SODIUM SUCCINATE SCH MG: 100 INJECTION, POWDER, FOR SOLUTION INTRAMUSCULAR; INTRAVENOUS at 06:21

## 2017-12-23 RX ADMIN — HYDROCORTISONE SODIUM SUCCINATE SCH MG: 100 INJECTION, POWDER, FOR SOLUTION INTRAMUSCULAR; INTRAVENOUS at 21:29

## 2017-12-23 RX ADMIN — HYDROCODONE BITARTRATE AND ACETAMINOPHEN PRN TAB: 5; 325 TABLET ORAL at 14:38

## 2017-12-23 RX ADMIN — ONDANSETRON PRN MG: 2 INJECTION, SOLUTION INTRAMUSCULAR; INTRAVENOUS at 22:14

## 2017-12-23 RX ADMIN — ONDANSETRON PRN MG: 2 INJECTION, SOLUTION INTRAMUSCULAR; INTRAVENOUS at 01:42

## 2017-12-23 NOTE — PCM.SURGPN
- General Info


Date of Service: 12/23/17


Date of Surgery/Procedure: 12/22/17


POD#: 1


Post-Op Diagnosis: Left groin abscess


Functional Status: Reports: Other (Patient went into septic shock 

postoperatively. She was initially responding to fluid boluses then this 

morning her BP dropped to 65/22. A central line was placed. Vasocpressin and 

Norepineiphrine drips were given. Her heart rate and blood pressure improved. 

Her antibiotics were broadened. )





- Review of Systems


General: Reports: Fever, Fatigue, Malaise, Chills, Night Sweats.  Denies: 

Appetite


Pulmonary: Reports: No Symptoms


Cardiovascular: Reports: No Symptoms


Gastrointestinal: Reports: No Symptoms


Skin: Reports: Other (Erytehamatous all over. )





- Patient Data


Vitals - Most Recent: 


 Last Vital Signs











Temp  37.2 C   12/23/17 04:00


 


Pulse  125 H  12/22/17 22:15


 


Resp  22 H  12/23/17 07:00


 


BP  74/30 L  12/23/17 07:00


 


Pulse Ox  97   12/23/17 07:00











Weight - Most Recent: 60 kg


I&O - Last 24 Hours: 


 Intake & Output











 12/22/17 12/23/17 12/23/17





 22:59 06:59 14:59


 


Intake Total  1070 


 


Output Total  750 


 


Balance  320 











Lab Results Last 24 Hrs: 


 Laboratory Results - last 24 hr











  12/23/17 12/23/17 12/23/17 Range/Units





  05:36 05:36 08:00 


 


WBC  7.59    (4.0-11.0)  K/uL


 


RBC  3.37 L    (4.30-5.90)  M/uL


 


Hgb  9.8 L    (12.0-16.0)  g/dL


 


Hct  30.8 L    (36.0-46.0)  %


 


MCV  91.4    (80.0-98.0)  fL


 


MCH  29.1    (27.0-32.0)  pg


 


MCHC  31.8    (31.0-37.0)  g/dL


 


RDW Std Deviation  50.5    (28.0-62.0)  fl


 


RDW Coeff of Hung  15    (11.0-15.0)  %


 


Plt Count  199    (150-400)  K/uL


 


MPV  10.40    (7.40-12.00)  fL


 


Neut % (Auto)  96.8 H    (48.0-80.0)  %


 


Lymph % (Auto)  1.1 L    (16.0-40.0)  %


 


Mono % (Auto)  1.8    (0.0-15.0)  %


 


Eos % (Auto)  0.3    (0.0-7.0)  %


 


Baso % (Auto)  0.0    (0.0-1.5)  %


 


Neut # (Auto)  7.4 H    (1.4-5.7)  K/uL


 


Lymph # (Auto)  0.1 L    (0.6-2.4)  K/uL


 


Mono # (Auto)  0.1    (0.0-0.8)  K/uL


 


Eos # (Auto)  0.0    (0.0-0.7)  K/uL


 


Baso # (Auto)  0.0    (0.0-0.1)  K/uL


 


Nucleated RBC %  0.0    /100WBC


 


Nucleated RBCs #  0    K/uL


 


ABG pH     (7.35-7.45)  


 


ABG pCO2     (35-45)  mmHG


 


ABG pO2     ()  mmHG


 


ABG HCO3     (22-26)  mEq/L


 


ABG Total CO2     


 


ABG Base Excess     (-2.0-2.0)  


 


Lactate    1.4  (0.20-2.00)  mmol/L


 


Sodium   138   (136-146)  mmol/L


 


Potassium   3.7   (3.5-5.1)  mmol/L


 


Chloride   111 H   ()  mmol/L


 


Carbon Dioxide   21   (21-31)  mmol/L


 


BUN   9   (6.0-23.0)  mg/dL


 


Creatinine   1.0   (0.6-1.5)  mg/dL


 


Est Cr Clr Drug Dosing   80.75   mL/min


 


Estimated GFR (MDRD)   > 60.0   ml/min


 


Glucose   110   ()  mg/dL


 


Calcium   7.9 L   (8.8-10.8)  mg/dL














  12/23/17 Range/Units





  08:00 


 


WBC   (4.0-11.0)  K/uL


 


RBC   (4.30-5.90)  M/uL


 


Hgb   (12.0-16.0)  g/dL


 


Hct   (36.0-46.0)  %


 


MCV   (80.0-98.0)  fL


 


MCH   (27.0-32.0)  pg


 


MCHC   (31.0-37.0)  g/dL


 


RDW Std Deviation   (28.0-62.0)  fl


 


RDW Coeff of Hung   (11.0-15.0)  %


 


Plt Count   (150-400)  K/uL


 


MPV   (7.40-12.00)  fL


 


Neut % (Auto)   (48.0-80.0)  %


 


Lymph % (Auto)   (16.0-40.0)  %


 


Mono % (Auto)   (0.0-15.0)  %


 


Eos % (Auto)   (0.0-7.0)  %


 


Baso % (Auto)   (0.0-1.5)  %


 


Neut # (Auto)   (1.4-5.7)  K/uL


 


Lymph # (Auto)   (0.6-2.4)  K/uL


 


Mono # (Auto)   (0.0-0.8)  K/uL


 


Eos # (Auto)   (0.0-0.7)  K/uL


 


Baso # (Auto)   (0.0-0.1)  K/uL


 


Nucleated RBC %   /100WBC


 


Nucleated RBCs #   K/uL


 


ABG pH  7.429  (7.35-7.45)  


 


ABG pCO2  32 L  (35-45)  mmHG


 


ABG pO2  73 L  ()  mmHG


 


ABG HCO3  21 L  (22-26)  mEq/L


 


ABG Total CO2  20.1  


 


ABG Base Excess  -2.5 L  (-2.0-2.0)  


 


Lactate   (0.20-2.00)  mmol/L


 


Sodium   (136-146)  mmol/L


 


Potassium   (3.5-5.1)  mmol/L


 


Chloride   ()  mmol/L


 


Carbon Dioxide   (21-31)  mmol/L


 


BUN   (6.0-23.0)  mg/dL


 


Creatinine   (0.6-1.5)  mg/dL


 


Est Cr Clr Drug Dosing   mL/min


 


Estimated GFR (MDRD)   ml/min


 


Glucose   ()  mg/dL


 


Calcium   (8.8-10.8)  mg/dL











Med Orders - Current: 


 Current Medications





Acetaminophen (Tylenol)  325 mg PO Q4H PRN


   PRN Reason: Fever


Hydrocodone Bitart/Acetaminophen (Norco 325-5 Mg)  2 tab PO Q4H PRN


   PRN Reason: Pain (moderate 4-6)


   Last Admin: 12/23/17 01:45 Dose:  2 tab


Cyclobenzaprine HCl (Flexeril)  5 mg PO BID PRN


   PRN Reason: Abdominal Pain


Diphenhydramine HCl (Benadryl)  50 mg IVPUSH Q4H PRN


   PRN Reason: Itching


Fentanyl (Sublimaze)  50 mcg IVPUSH Q5M PRN


   PRN Reason: Pain (severe 7-10)


   Stop: 12/23/17 20:21


   Last Admin: 12/22/17 20:33 Dose:  50 mcg


Hydrocortisone Sodium Succinate (Solu-Cortef)  100 mg IVPUSH Q8H MANUELA


   Last Admin: 12/23/17 06:21 Dose:  100 mg


Hydromorphone HCl (Dilaudid)  0.5 mg IVPUSH Q1H PRN


   PRN Reason: Pain (severe 7-10)


Cefuroxime Sodium 1.5 gm/ (Sodium Chloride)  50 mls @ 100 mls/hr IV Q8H MANUELA


   Last Admin: 12/23/17 02:14 Dose:  100 mls/hr


Vancomycin HCl 1 gm/ Sodium (Chloride)  250 mls @ 166 mls/hr IV Q8H MANUELA


Norepinephrine Bitartrate (Norepinephr-0.9% Nacl 4 Mg/250)  4 mg in 250 mls @ 

7.5 mls/hr IV TITRATE MANUELA; 2 MCG/MIN


   PRN Reason: Protocol


Vasopressin 100 units/ Sodium (Chloride)  100 mls @ 0.6 mls/hr IV TITRATE MANUELA; 

0.01 UNITS/MIN


   PRN Reason: Protocol


Ondansetron HCl (Zofran)  4 mg IVPUSH Q6H PRN


   PRN Reason: Nausea/Vomiting


   Last Admin: 12/23/17 01:42 Dose:  4 mg





Discontinued Medications





Bupivacaine HCl (Sensorcaine-Mpf 0.5%) Confirm Administered Dose 10 ml .ROUTE 

.STK-MED ONE


   Stop: 12/22/17 19:08


Diphenhydramine HCl (Benadryl)  25 mg IVPUSH ONETIME ONE


   Stop: 12/22/17 20:29


   Last Admin: 12/22/17 20:38 Dose:  25 mg


Diphenhydramine HCl (Benadryl) Confirm Administered Dose 50 mg .ROUTE .STK-MED 

ONE


   Stop: 12/22/17 20:38


   Last Admin: 12/22/17 22:14 Dose:  Not Given


Factor IX (Pha) (Kcentra)  0 unit IV ONETIME ONE


   Stop: 12/23/17 03:31


   Last Admin: 12/23/17 03:54 Dose:  Not Given


Fentanyl (Sublimaze) Confirm Administered Dose 100 mcg .ROUTE .STK-MED ONE


   Stop: 12/22/17 19:08


Fentanyl (Sublimaze) Confirm Administered Dose 100 mcg .ROUTE .STK-MED ONE


   Stop: 12/22/17 19:34


Hydromorphone HCl (Dilaudid)  0.5 mg IVPUSH Q1H PRN


   PRN Reason: Pain (severe 7-10)


   Last Admin: 12/22/17 21:57 Dose:  0.5 mg


Sodium Chloride (Normal Saline)  1,000 mls @ 999 mls/hr IV STAT ONE


   Stop: 12/22/17 15:21


   Last Admin: 12/22/17 14:45 Dose:  999 mls/hr


Sodium Chloride (Normal Saline)  1,000 mls @ 999 mls/hr IV STAT ONE


   Stop: 12/22/17 19:07


   Last Admin: 12/22/17 19:34 Dose:  Not Given


Metronidazole 500 mg/ Premix  100 mls @ 100 mls/hr IV ONETIME ONE


   Stop: 12/22/17 20:06


   Last Admin: 12/22/17 19:18 Dose:  100 mls/hr


Vancomycin HCl 1 gm/ Sodium (Chloride)  250 mls @ 250 mls/hr IV ONETIME ONE


   Stop: 12/22/17 20:06


   Last Admin: 12/22/17 22:13 Dose:  Not Given


Lactated Ringer's (Ringers, Lactated)  1,000 mls @ 150 mls/hr IV ASDIRECTED [facility]


Sodium Chloride (Normal Saline) Confirm Administered Dose 250 mls @ as directed 

.ROUTE .STK-MED ONE


   Stop: 12/22/17 19:37


   Last Admin: 12/22/17 22:14 Dose:  Not Given


Lactated Ringer's (Ringers, Lactated)  1,000 mls @ 150 mls/hr IV ASDIRECTED MANUELA


   Last Admin: 12/22/17 23:10 Dose:  150 mls/hr


Vancomycin HCl 0.75 gm/ Sodium (Chloride)  250 mls @ 166 mls/hr IV Q12H MANUELA


Metronidazole 250 mg/ Premix  50 mls @ 50 mls/hr IV QID MANUELA


   Last Admin: 12/22/17 23:46 Dose:  50 mls/hr


Acetaminophen 1,000 mg/ Premix  100 mls @ 400 mls/hr IV NOW ONE


   Stop: 12/22/17 21:19


   Last Admin: 12/22/17 22:15 Dose:  Not Given


Acetaminophen (Ofirmev) Confirm Administered Dose 100 mls @ as directed IV .STK-

MED ONE


   Stop: 12/22/17 21:15


   Last Admin: 12/22/17 22:15 Dose:  Not Given


Lactated Ringer's (Ringers, Lactated)  1,000 mls @ 1,000 mls/hr IV .BOLUS ONE


   Stop: 12/22/17 22:49


   Last Admin: 12/22/17 22:17 Dose:  1,000 mls/hr


Lactated Ringer's (Ringers, Lactated)  1,000 mls @ 999 mls/hr IV .BOLUS ONE


   Stop: 12/23/17 01:46


   Last Admin: 12/23/17 00:51 Dose:  999 mls/hr


Cefuroxime Sodium 1.5 gm/ (Sodium Chloride)  50 mls @ 100 mls/hr IV Q8HR MANUELA


Potassium Chloride 40 meq/ (Sodium Chloride)  500 mls @ 125 mls/hr IV ONETIME 

ONE


   Stop: 12/23/17 07:59


   Last Admin: 12/23/17 04:49 Dose:  125 mls/hr


Lactated Ringer's (Ringers, Lactated)  1,000 mls @ 999 mls/hr IV .BOLUS ONE


   Stop: 12/23/17 06:55


   Last Admin: 12/23/17 06:21 Dose:  999 mls/hr


Norepinephrine Bitartrate (Norepinephr-0.9% Nacl 4 Mg/250) Confirm Administered 

Dose 4 mg in 250 mls @ as directed IV .STK-MED ONE


   Stop: 12/23/17 07:56


Iopamidol (Isovue Multipack-370 (76%))  100 ml IVPUSH ONETIME STA


   Stop: 12/22/17 16:05


   Last Admin: 12/22/17 16:05 Dose:  100 ml


Ketorolac Tromethamine (Toradol)  15 mg IVPUSH Q6H PRN


   PRN Reason: Abdominal Pain


   Stop: 12/27/17 20:29


Lidocaine (Xylocaine-Mpf 2%) Confirm Administered Dose 5 ml .ROUTE .STK-MED ONE


   Stop: 12/22/17 19:07


Meperidine HCl (Demerol)  25 mg IVPUSH ONETIME ONE


   Stop: 12/22/17 20:50


   Last Admin: 12/22/17 20:55 Dose:  25 mg


Meperidine HCl (Demerol) Confirm Administered Dose 25 mg .ROUTE .STK-MED ONE


   Stop: 12/22/17 20:51


   Last Admin: 12/22/17 22:14 Dose:  Not Given


Midazolam HCl (Versed 1 Mg/Ml) Confirm Administered Dose 2 mg .ROUTE .STK-MED 

ONE


   Stop: 12/22/17 19:08


Morphine Sulfate (Morphine)  2 mg IVPUSH ONETIME ONE


   Stop: 12/22/17 14:23


   Last Admin: 12/22/17 14:45 Dose:  2 mg


Morphine Sulfate (Morphine)  2 mg IVPUSH ONETIME ONE


   Stop: 12/22/17 17:37


   Last Admin: 12/22/17 17:56 Dose:  2 mg


Propofol (Diprivan  20 Ml) Confirm Administered Dose 400 mg .ROUTE .STK-MED ONE


   Stop: 12/22/17 19:08


Propofol (Diprivan  20 Ml) Confirm Administered Dose 200 mg .ROUTE .STK-MED ONE


   Stop: 12/22/17 19:43


Vancomycin HCl (Vancocin) Confirm Administered Dose 1 gm .ROUTE .STK-MED ONE


   Stop: 12/22/17 19:34


   Last Admin: 12/22/17 22:14 Dose:  Not Given











- Exam


Wound/Incisions: Other (Wound appears dark red with no evidence of dishwater 

fluid or necrotic tissue. Cellulitis has resolved)


General: Alert, Oriented, Mild Distress


Neck: Supple, Trachea Midline, No JVD


Lungs: Normal Respiratory Effort


Cardiovascular: Regular Rate, Regular Rhythm


GI/Abdominal Exam: Soft, Non-Tender, No Distention


Extremities: Normal Inspection


Skin: Warm, Dry, Intact


Neurological: No New Focal Deficit





- Problem List & Annotations


(1) Cellulitis


SNOMED Code(s): 537697739


   Code(s): L03.90 - CELLULITIS, UNSPECIFIED   Status: Acute   Current Visit: 

Yes   





(2) Abscess


SNOMED Code(s): 440152613


   Code(s): L02.91 - CUTANEOUS ABSCESS, UNSPECIFIED   Status: Acute   Current 

Visit: Yes   





(3) Septic shock


SNOMED Code(s): 64540821


   Code(s): A41.9 - SEPSIS, UNSPECIFIED ORGANISM; R65.21 - SEVERE SEPSIS WITH 

SEPTIC SHOCK   Status: Acute   Current Visit: Yes   





- Problem List Review


Problem List Initiated/Reviewed/Updated: Yes





- My Orders


Last 24 Hours: 


 Active Orders 24 hr











 Category Date Time Status


 


 Verify Patient Consent Obtain [RC] ASDIRECTED Care  12/23/17 07:41 Active


 


 Chest 1V Frontal [CR] Stat Exams  12/23/17 08:13 Ordered


 


 VANCOMYCIN TROUGH [CHEM] Timed Lab  12/24/17 15:30 Ordered


 


 Cefuroxime [Zinacef] 1.5 gm Med  12/23/17 02:00 Active





 Sodium Chloride 0.9% [Normal Saline] 50 ml   





 IV Q8H   


 


 HYDROmorphone [Dilaudid] Med  12/23/17 08:15 Active





 0.5 mg IVPUSH Q1H PRN   


 


 Hydrocortisone Sod Succinate [Solu-CORTEF] Med  12/23/17 06:00 Active





 100 mg IVPUSH Q8H   


 


 Norepinephrine Bit/0.9 % NaCl [Norepinephr-0.9% NaCl 4 Med  12/23/17 07:45 

Active





 mg/250]   





 4 mg in 250 ml IV TITRATE   


 


 Vancomycin [Vancocin] 1 gm Med  12/23/17 08:00 Active





 Sodium Chloride 0.9% [Normal Saline] 250 ml   





 IV Q8H   


 


 Vasopressin 100 units Med  12/23/17 08:30 Active





 Sodium Chloride 0.9% [Normal Saline] 95 ml   





 IV TITRATE   








 Medication Orders





Acetaminophen (Tylenol)  325 mg PO Q4H PRN


   PRN Reason: Fever


Hydrocodone Bitart/Acetaminophen (Norco 325-5 Mg)  2 tab PO Q4H PRN


   PRN Reason: Pain (moderate 4-6)


   Last Admin: 12/23/17 01:45  Dose: 2 tab


Cyclobenzaprine HCl (Flexeril)  5 mg PO BID PRN


   PRN Reason: Abdominal Pain


Diphenhydramine HCl (Benadryl)  50 mg IVPUSH Q4H PRN


   PRN Reason: Itching


Fentanyl (Sublimaze)  50 mcg IVPUSH Q5M PRN


   PRN Reason: Pain (severe 7-10)


   Stop: 12/23/17 20:21


   Last Admin: 12/22/17 20:33  Dose: 50 mcg


   Admin: 12/22/17 20:24  Dose: 50 mcg


Hydrocortisone Sodium Succinate (Solu-Cortef)  100 mg IVPUSH Q8H MANUELA


   Last Admin: 12/23/17 06:21  Dose: 100 mg


Hydromorphone HCl (Dilaudid)  0.5 mg IVPUSH Q1H PRN


   PRN Reason: Pain (severe 7-10)


Cefuroxime Sodium 1.5 gm/ (Sodium Chloride)  50 mls @ 100 mls/hr IV Q8H MANUELA


   Last Admin: 12/23/17 02:14  Dose: 100 mls/hr


Vancomycin HCl 1 gm/ Sodium (Chloride)  250 mls @ 166 mls/hr IV Q8H MANUELA


Norepinephrine Bitartrate (Norepinephr-0.9% Nacl 4 Mg/250)  4 mg in 250 mls @ 

7.5 mls/hr IV TITRATE MANUELA; 2 MCG/MIN


   PRN Reason: Protocol


Vasopressin 100 units/ Sodium (Chloride)  100 mls @ 0.6 mls/hr IV TITRATE MANUELA; 

0.01 UNITS/MIN


   PRN Reason: Protocol


Ondansetron HCl (Zofran)  4 mg IVPUSH Q6H PRN


   PRN Reason: Nausea/Vomiting


   Last Admin: 12/23/17 01:42  Dose: 4 mg











- Plan


Plan                        (Free Text/Narrative):: 


-Pain: IV dilauid percocet prn


-Cards: Norepinephrine and vasopressin drip. HR and BP have greatly improved


-Pulm: O2 sats 99% on 1L


-GI: NPO with ice chips and sips with meds until improving


-Renal: BUN/Cr WNL and UOP adequate. CVP is 10 after 3L of fluid bolus 

overnight. 


-ID: Vancomycin and meropenem. Patient has a history of MRSA infections. 

Preliminary gram stain shows no organisms. Will treat with broad spectrum 

antibiotics. 


-Patient recently finished a medrol dose pack. She has a history of steriod use 

for her lupus. If pressor requirements increasing then patient will need to be 

given stress dose of steroids.

## 2017-12-23 NOTE — OR
SURGEON:

ZULLY BELLO MD

 

DATE OF PROCEDURE:  12/23/2017

 

PREOPERATIVE DIAGNOSIS:

Septic shock.

 

POSTOPERATIVE DIAGNOSIS:

Septic shock.

 

PROCEDURE PERFORMED:

Right internal jugular central line placement.

 

ANESTHESIA:

Local.

 

ESTIMATED BLOOD LOSS:

5 mL.

 

FINDINGS:

Right internal jugular catheter placement within the superior vena cava.

 

COMPLICATIONS:

None.

 

INDICATIONS:

The patient is a 27-year-old female who underwent an incision and drainage of a

left groin abscess last evening.  Postoperatively, she developed septic shock

and is in need of pressor support.  The decision was made to perform a central

line placement.  We discussed the procedure with the patient including the risks

and benefits.  The patient verbalized understanding and wishes to proceed.

 

PROCEDURE IN DETAIL:

The patient was met in her ICU room on her bed.  She was placed into

Trendelenburg position and an ultrasound was used to identify the vascular

anatomy of the right side of the neck.  I identified both the right carotid and

right internal jugular vein.  The right neck was then prepped and draped in the

usual standard fashion.  I anesthetized the area overlying the right internal

jugular vein with 1% lidocaine plain.  An ultrasound was used to reconfirm the

vascular anatomy of the right side of the neck.  A finding needle was placed

under ultrasound guidance into the right internal jugular vein.  A good flush of

venous blood was obtained.  A guidewire was placed down into the vein with no

resistance.  A small nick was made in the skin using 11 blade over the

guidewire.  A dilator was used to dilate the vein.  This was removed and a

triple-lumen catheter was placed over the guidewire and secured at the skin at

15 cm.  All 3 ports were aspirated and flushed easily.  A 3-0 silk was used to

secure the central line in place in 2 different positions.  A postoperative

chest x-ray showed good placement of the central line within the superior vena

cava with no evidence of pneumothorax or hemothorax.  The patient was started on

norepinephrine and vasopressin with good improvement in her blood pressure.  The

patient tolerated the procedure well.

 

 

DANDRE LOUISE

DD:  12/23/2017 09:50:11

DT:  12/23/2017 10:31:06

Job #:  [id number]

## 2017-12-23 NOTE — PCM.SN
- Free Text/Narrative


Note: 


Rounded at the bedside at 0710, on my arrival the patient was completing her 

3rd Liter of NS bolus per EICU.  SBP 50-60's, pt is very lethargic and 

difficult to arouse, pt clinically looks and states she feels worse this 

morning.  Immediately post-operatively it was noted that the patient had 

mottled appearance to all four extremities.  This morning she is bright red 

over her entire body.   I called Dr Hernandez and recommended emergent CVL and 

arterial line placement for vasoactive support.  ABG and Lactic Acid were also 

drawn to rule out worsening acidosis.  I consented the patient for arterial 

line and central venous line; risks and benefits were explained and she wishes 

to continue at this time.





Central Venous Line


See Dr Hernandez's Procedure note.  Post Placement CVP





Arterial Line


Rt radial artery was palpated.  Collateral arterial circulation was confirmed.  

Rt wrist was prepped with chlor-prep and draped in sterile fashion.  Rt radial 

artery was again palpated.  20g Arrow catheter was then introduced in the right 

radial artery, bright red pulsating blood return was noted.  Catheter was 

transduced with good arterial waveform.  Arterial line was secured with tape, 

tegaderm, and armboard.  Pt tolerated placement well.  At this time NIBP and 

Arterial line are correlating.  BP after placement 52/18, .





After arterial line and central line placement, levophed drip was titrated to 

12mcg/min and vasopressin was titrated to 2 units/hr by me.  Dr Hernandez is at 

the bedside and agrees with the current treatment plan.  The patient has now 

stabilized with HR 60's SA, BP 95/37, CVP 10-11.  Dr Hernandez has reordered CT ABD

/Pelvis to re-evaluate other etiologies and the patient is being transported to 

CT at this time.

## 2017-12-23 NOTE — OR
SURGEON:

ZULLY BELLO MD

 

DATE OF PROCEDURE:  12/22/2017

 

PREOPERATIVE DIAGNOSIS:

Left groin abscess and cellulitis.

 

POSTOPERATIVE DIAGNOSIS:

Left groin abscess and cellulitis.

 

PROCEDURE PERFORMED:

Incision and drainage of left groin abscess.

 

ANESTHESIA:

Monitored anesthesia care.

 

FLUIDS:

See anesthesia record.

 

ESTIMATED BLOOD LOSS:

5 mL.

 

FINDINGS:

2 x 2.5 x 2 cm left groin abscess.

 

COMPLICATIONS:

None.

 

INDICATIONS:

The patient is a 27-year-old female with a history of lupus, on Plaquenil, who

presents with two weeks worth of inflammation, pain, and swelling along the left

groin.  She was treated as an outpatient with clindamycin, however, she

developed a lump in the area.  An ultrasound was performed of the left groin

that showed an indeterminate 3.5 x 1.5 soft tissue structure deep to her

subcutaneous edema in the left groin.  Given that this was nondescript, she was

told to complete her antibiotics.  She started developing worsening pain,

swelling, and erythema around the area.  She was told to the emergency room

should things worsen.  In the ER, a CT was performed that showed a fluid

collection as well as some reactive lymphadenopathy around the area.  I examined

the patient in the ED and she was noted to have a fluctuant area that was

exquisitely tender to touch just above the left groin crease.  This was

associated with overlying cellulitis.  I explained to the patient the need to

incise and drain this.  Given its size and tenderness, the decision was made to

take her to the operating room to perform this under monitored anesthesia care.

We discussed the procedures and expected perioperative course.  We discussed the

risks including bleeding, infection, or damage to surrounding structures

including perforation.  The patient verbalized understanding and wishes to

proceed.

 

PROCEDURE IN DETAIL:

The patient was brought into the OR and placed on the OR table in supine

position.  A time-out was completed verifying the patient's name, age, date of

birth, allergies, and procedure to be performed.  Monitored anesthesia care was

induced.  The left groin was prepped and draped in the usual standard fashion.

I anesthetized the area over the left groin mass with 1% lidocaine plain.  A 2

cm cruciate incision was made over this and purulence was expressed.  Gram

stain, anaerobic, and aerobic cultures were taken.  The wound tracked deep and

any loculations were broken up using finger dissection.  The wound was then

copiously irrigated with normal saline.  The edges of the cruciate incision were

sharply excised.  The wound was then packed with moistened vaginal packing and

covered with dry gauze.  This was secured in place with Medipore tape.  The

patient was taken to the PACU in stable condition.

 

 

DANDRE LOUISE

DD:  12/23/2017 02:15:15

DT:  12/23/2017 02:59:13

Job #:  [id number]

## 2017-12-24 LAB
CHLORIDE SERPL-SCNC: 105 MMOL/L (ref 98–110)
SODIUM SERPL-SCNC: 130 MMOL/L (ref 136–146)

## 2017-12-24 RX ADMIN — HYDROCORTISONE SODIUM SUCCINATE SCH: 100 INJECTION, POWDER, FOR SOLUTION INTRAMUSCULAR; INTRAVENOUS at 17:43

## 2017-12-24 RX ADMIN — LINEZOLID SCH MLS/HR: 600 INJECTION, SOLUTION INTRAVENOUS at 20:13

## 2017-12-24 RX ADMIN — HYDROCORTISONE SODIUM SUCCINATE SCH MG: 100 INJECTION, POWDER, FOR SOLUTION INTRAMUSCULAR; INTRAVENOUS at 05:44

## 2017-12-24 RX ADMIN — HYDROCODONE BITARTRATE AND ACETAMINOPHEN PRN TAB: 5; 325 TABLET ORAL at 08:07

## 2017-12-24 RX ADMIN — LINEZOLID SCH MLS/HR: 600 INJECTION, SOLUTION INTRAVENOUS at 09:40

## 2017-12-24 RX ADMIN — HYDROCORTISONE SODIUM SUCCINATE SCH MG: 100 INJECTION, POWDER, FOR SOLUTION INTRAMUSCULAR; INTRAVENOUS at 21:01

## 2017-12-24 RX ADMIN — DIPHENHYDRAMINE HYDROCHLORIDE PRN MG: 50 INJECTION, SOLUTION INTRAMUSCULAR; INTRAVENOUS at 19:54

## 2017-12-24 RX ADMIN — DIPHENHYDRAMINE HYDROCHLORIDE PRN MG: 50 INJECTION, SOLUTION INTRAMUSCULAR; INTRAVENOUS at 07:13

## 2017-12-24 NOTE — [email or web address].[email or web address]
- General Info


Date of Service: 12/24/17


Date of Surgery/Procedure: 12/22/17


POD#: 2


Functional Status: Reports: Other (Pain not well controlled on percocet. 

Patient did not sleep well last night and is tearful this morning. Pressors 

stopped this morning. BP appears stable so far. Patient c/o itching and rash 

over body. Given benadryl with little relief. Emesis x 2 yesterday. Given 

scopalamine patch with good relief. Appetite improved this morning. )





- Review of Systems


General: Reports: No Symptoms


HEENT: Reports: No Symptoms


Pulmonary: Reports: No Symptoms


Cardiovascular: Reports: No Symptoms


Gastrointestinal: Reports: No Symptoms


Genitourinary: Reports: No Symptoms


Musculoskeletal: Reports: No Symptoms


Skin: Reports: Pruritis, Rash, Other (Pain and swelling around incision site )


Neurological: Reports: No Symptoms


Systems Review Comment:: 


Feels "puffy"





- Patient Data


Vitals - Most Recent: 


 Last Vital Signs











Temp  37.2 C   12/24/17 00:00


 


Pulse  74   12/23/17 21:00


 


Resp  20   12/24/17 07:00


 


BP  129/60   12/24/17 07:00


 


Pulse Ox  97   12/24/17 07:00











Weight - Most Recent: 62 kg


I&O - Last 24 Hours: 


 Intake & Output











 12/23/17 12/24/17 12/24/17





 22:59 06:59 14:59


 


Intake Total 4447 2300 


 


Output Total 900 1350 


 


Balance 3547 950 











Lab Results Last 24 Hrs: 


 Laboratory Results - last 24 hr











  12/23/17 12/23/17 12/24/17 Range/Units





  15:43 15:43 05:18 


 


WBC  11.21 H   11.27 H  (4.0-11.0)  K/uL


 


RBC  3.28 L   3.27 L  (4.30-5.90)  M/uL


 


Hgb  9.6 L   9.6 L  (12.0-16.0)  g/dL


 


Hct  29.5 L   29.2 L  (36.0-46.0)  %


 


MCV  89.9   89.3  (80.0-98.0)  fL


 


MCH  29.3   29.4  (27.0-32.0)  pg


 


MCHC  32.5   32.9  (31.0-37.0)  g/dL


 


RDW Std Deviation  49.3   48.8  (28.0-62.0)  fl


 


RDW Coeff of Hung  15   15  (11.0-15.0)  %


 


Plt Count  206   177  (150-400)  K/uL


 


MPV  10.80   10.70  (7.40-12.00)  fL


 


Neut % (Auto)  97.7 H    (48.0-80.0)  %


 


Lymph % (Auto)  1.0 L    (16.0-40.0)  %


 


Mono % (Auto)  1.2    (0.0-15.0)  %


 


Eos % (Auto)  0.1    (0.0-7.0)  %


 


Baso % (Auto)  0.0    (0.0-1.5)  %


 


Neut # (Auto)  11.0 H    (1.4-5.7)  K/uL


 


Lymph # (Auto)  0.1 L    (0.6-2.4)  K/uL


 


Mono # (Auto)  0.1    (0.0-0.8)  K/uL


 


Eos # (Auto)  0.0    (0.0-0.7)  K/uL


 


Baso # (Auto)  0.0    (0.0-0.1)  K/uL


 


Add Manual Diff    YES  


 


Neutrophils % (Manual)    84 H  (48.0-80.0)  %


 


Band Neutrophils %    11  %


 


Lymphocytes % (Manual)    1 L  (16.0-40.0)  %


 


Monocytes % (Manual)    3  (0.0-15.0)  %


 


Eosinophils % (Manual)    1  (0.0-7.0)  %


 


Nucleated RBC %  0.0   0.0  /100WBC


 


Absolute Seg Neuts    9.5 H  (1.4-5.7)  


 


Band Neutrophils #    1.2  


 


Lymphocytes # (Manual)    0.1 L  (0.6-2.4)  


 


Monocytes # (Manual)    0.3  (0.0-0.8)  


 


Eosinophils # (Manual)    0.1  (0.0-0.7)  


 


Nucleated RBCs #  0   0  K/uL


 


Lactate   1.7   (0.20-2.00)  mmol/L


 


Sodium     (136-146)  mmol/L


 


Potassium     (3.5-5.1)  mmol/L


 


Chloride     ()  mmol/L


 


Carbon Dioxide     (21-31)  mmol/L


 


BUN     (6.0-23.0)  mg/dL


 


Creatinine     (0.6-1.5)  mg/dL


 


Est Cr Clr Drug Dosing     mL/min


 


Estimated GFR (MDRD)     ml/min


 


Glucose     ()  mg/dL


 


Calcium     (8.8-10.8)  mg/dL














  12/24/17 12/24/17 Range/Units





  05:18 05:18 


 


WBC    (4.0-11.0)  K/uL


 


RBC    (4.30-5.90)  M/uL


 


Hgb    (12.0-16.0)  g/dL


 


Hct    (36.0-46.0)  %


 


MCV    (80.0-98.0)  fL


 


MCH    (27.0-32.0)  pg


 


MCHC    (31.0-37.0)  g/dL


 


RDW Std Deviation    (28.0-62.0)  fl


 


RDW Coeff of Hung    (11.0-15.0)  %


 


Plt Count    (150-400)  K/uL


 


MPV    (7.40-12.00)  fL


 


Neut % (Auto)    (48.0-80.0)  %


 


Lymph % (Auto)    (16.0-40.0)  %


 


Mono % (Auto)    (0.0-15.0)  %


 


Eos % (Auto)    (0.0-7.0)  %


 


Baso % (Auto)    (0.0-1.5)  %


 


Neut # (Auto)    (1.4-5.7)  K/uL


 


Lymph # (Auto)    (0.6-2.4)  K/uL


 


Mono # (Auto)    (0.0-0.8)  K/uL


 


Eos # (Auto)    (0.0-0.7)  K/uL


 


Baso # (Auto)    (0.0-0.1)  K/uL


 


Add Manual Diff    


 


Neutrophils % (Manual)    (48.0-80.0)  %


 


Band Neutrophils %    %


 


Lymphocytes % (Manual)    (16.0-40.0)  %


 


Monocytes % (Manual)    (0.0-15.0)  %


 


Eosinophils % (Manual)    (0.0-7.0)  %


 


Nucleated RBC %    /100WBC


 


Absolute Seg Neuts    (1.4-5.7)  


 


Band Neutrophils #    


 


Lymphocytes # (Manual)    (0.6-2.4)  


 


Monocytes # (Manual)    (0.0-0.8)  


 


Eosinophils # (Manual)    (0.0-0.7)  


 


Nucleated RBCs #    K/uL


 


Lactate   1.8  (0.20-2.00)  mmol/L


 


Sodium  130 L   (136-146)  mmol/L


 


Potassium  3.9   (3.5-5.1)  mmol/L


 


Chloride  105   ()  mmol/L


 


Carbon Dioxide  18 L   (21-31)  mmol/L


 


BUN  13   (6.0-23.0)  mg/dL


 


Creatinine  0.7   (0.6-1.5)  mg/dL


 


Est Cr Clr Drug Dosing  119.20   mL/min


 


Estimated GFR (MDRD)  > 60.0   ml/min


 


Glucose  139 H   ()  mg/dL


 


Calcium  7.0 L   (8.8-10.8)  mg/dL











Med Orders - Current: 


 Current Medications





Acetaminophen (Tylenol)  650 mg PO Q4H PRN


   PRN Reason: Pain


Cyclobenzaprine HCl (Flexeril)  5 mg PO BID PRN


   PRN Reason: Abdominal Pain


Diphenhydramine HCl (Benadryl)  50 mg IVPUSH Q4H PRN


   PRN Reason: Itching


   Last Admin: 12/24/17 07:13 Dose:  50 mg


Hydrocortisone Sodium Succinate (Solu-Cortef)  100 mg IVPUSH Q8H [facility]


   Last Admin: 12/24/17 05:44 Dose:  100 mg


Hydromorphone HCl (Dilaudid)  0.5 mg IVPUSH Q1H PRN


   PRN Reason: Pain (severe 7-10)


   Last Admin: 12/24/17 03:14 Dose:  0.5 mg


Norepinephrine Bitartrate (Norepinephr-0.9% Nacl 4 Mg/250)  4 mg in 250 mls @ 

7.5 mls/hr IV TITRATE [facility]; 2 MCG/MIN


   PRN Reason: Protocol


   Last Admin: 12/24/17 08:10 Dose:  3 mcg/min, 11.25 mls/hr


Vasopressin 100 units/ Sodium (Chloride)  100 mls @ 0.6 mls/hr IV TITRATE [facility]; 

0.01 UNITS/MIN


   PRN Reason: Protocol


   Last Admin: 12/23/17 08:39 Dose:  0.033 units/min, 1.98 mls/hr


Linezolid 600 mg/ Premix  300 mls @ 300 mls/hr IV Q12H [facility]


Oxycodone HCl (Oxycodone)  10 mg PO Q4H PRN


   PRN Reason: Abdominal Pain


Promethazine HCl (Phenergan)  25 mg IM Q6H PRN


   PRN Reason: Vomiting


Scopolamine (Transderm-Scop)  1.5 mg TRDERM Q72H PRN


   PRN Reason: Nausea


   Last Admin: 12/23/17 20:18 Dose:  1.5 mg





Discontinued Medications





Acetaminophen (Tylenol)  325 mg PO Q4H PRN


   PRN Reason: Fever


Hydrocodone Bitart/Acetaminophen (Norco 325-5 Mg)  2 tab PO Q4H PRN


   PRN Reason: Pain (moderate 4-6)


   Last Admin: 12/24/17 08:07 Dose:  2 tab


Bupivacaine HCl (Sensorcaine-Mpf 0.5%) Confirm Administered Dose 10 ml .ROUTE 

.STK-MED ONE


   Stop: 12/22/17 19:08


Cetirizine HCl (Zyrtec)  10 mg PO ONETIME ONE


   Stop: 12/24/17 08:48


Cyclobenzaprine HCl (Flexeril)  5 mg PO BID PRN


   PRN Reason: Abdominal Pain


Diphenhydramine HCl (Benadryl)  25 mg IVPUSH ONETIME ONE


   Stop: 12/22/17 20:29


   Last Admin: 12/22/17 20:38 Dose:  25 mg


Diphenhydramine HCl (Benadryl) Confirm Administered Dose 50 mg .ROUTE .STK-MED 

ONE


   Stop: 12/22/17 20:38


   Last Admin: 12/22/17 22:14 Dose:  Not Given


Factor IX (Pha) (Kcentra)  0 unit IV ONETIME ONE


   Stop: 12/23/17 03:31


   Last Admin: 12/23/17 03:54 Dose:  Not Given


Fentanyl (Sublimaze) Confirm Administered Dose 100 mcg .ROUTE .STK-MED ONE


   Stop: 12/22/17 19:08


Fentanyl (Sublimaze) Confirm Administered Dose 100 mcg .ROUTE .STK-MED ONE


   Stop: 12/22/17 19:34


Fentanyl (Sublimaze)  50 mcg IVPUSH Q5M PRN


   PRN Reason: Pain (severe 7-10)


   Stop: 12/23/17 20:21


   Last Admin: 12/22/17 20:33 Dose:  50 mcg


Hydromorphone HCl (Dilaudid)  0.5 mg IVPUSH Q1H PRN


   PRN Reason: Pain (severe 7-10)


   Last Admin: 12/22/17 21:57 Dose:  0.5 mg


Sodium Chloride (Normal Saline)  1,000 mls @ 999 mls/hr IV STAT ONE


   Stop: 12/22/17 15:21


   Last Admin: 12/22/17 14:45 Dose:  999 mls/hr


Sodium Chloride (Normal Saline)  1,000 mls @ 999 mls/hr IV STAT ONE


   Stop: 12/22/17 19:07


   Last Admin: 12/22/17 19:34 Dose:  Not Given


Metronidazole 500 mg/ Premix  100 mls @ 100 mls/hr IV ONETIME ONE


   Stop: 12/22/17 20:06


   Last Admin: 12/22/17 19:18 Dose:  100 mls/hr


Vancomycin HCl 1 gm/ Sodium (Chloride)  250 mls @ 250 mls/hr IV ONETIME ONE


   Stop: 12/22/17 20:06


   Last Admin: 12/22/17 22:13 Dose:  Not Given


Lactated Ringer's (Ringers, Lactated)  1,000 mls @ 150 mls/hr IV ASDIRECTED [facility]


Sodium Chloride (Normal Saline) Confirm Administered Dose 250 mls @ as directed 

.ROUTE .STK-MED ONE


   Stop: 12/22/17 19:37


   Last Admin: 12/22/17 22:14 Dose:  Not Given


Lactated Ringer's (Ringers, Lactated)  1,000 mls @ 150 mls/hr IV ASDIRECTED [facility]


   Last Admin: 12/22/17 23:10 Dose:  150 mls/hr


Vancomycin HCl 0.75 gm/ Sodium (Chloride)  250 mls @ 166 mls/hr IV Q12H [facility]


Metronidazole 250 mg/ Premix  50 mls @ 50 mls/hr IV QID [facility]


   Last Admin: 12/22/17 23:46 Dose:  50 mls/hr


Acetaminophen 1,000 mg/ Premix  100 mls @ 400 mls/hr IV NOW ONE


   Stop: 12/22/17 21:19


   Last Admin: 12/22/17 22:15 Dose:  Not Given


Acetaminophen (Ofirmev) Confirm Administered Dose 100 mls @ as directed IV .STK-

MED ONE


   Stop: 12/22/17 21:15


   Last Admin: 12/22/17 22:15 Dose:  Not Given


Lactated Ringer's (Ringers, Lactated)  1,000 mls @ 1,000 mls/hr IV .BOLUS ONE


   Stop: 12/22/17 22:49


   Last Admin: 12/22/17 22:17 Dose:  1,000 mls/hr


Lactated Ringer's (Ringers, Lactated)  1,000 mls @ 999 mls/hr IV .BOLUS ONE


   Stop: 12/23/17 01:46


   Last Admin: 12/23/17 00:51 Dose:  999 mls/hr


Cefuroxime Sodium 1.5 gm/ (Sodium Chloride)  50 mls @ 100 mls/hr IV Q8HR [facility]


Cefuroxime Sodium 1.5 gm/ (Sodium Chloride)  50 mls @ 100 mls/hr IV Q8H [facility]


   Last Admin: 12/23/17 02:14 Dose:  100 mls/hr


Vancomycin HCl 1 gm/ Sodium (Chloride)  250 mls @ 166 mls/hr IV Q8H [facility]


   Last Admin: 12/24/17 08:06 Dose:  166 mls/hr


Potassium Chloride 40 meq/ (Sodium Chloride)  500 mls @ 125 mls/hr IV ONETIME 

ONE


   Stop: 12/23/17 07:59


   Last Admin: 12/23/17 04:49 Dose:  125 mls/hr


Lactated Ringer's (Ringers, Lactated)  1,000 mls @ 999 mls/hr IV .BOLUS ONE


   Stop: 12/23/17 06:55


   Last Admin: 12/23/17 06:21 Dose:  999 mls/hr


Norepinephrine Bitartrate (Norepinephr-0.9% Nacl 4 Mg/250) Confirm Administered 

Dose 4 mg in 250 mls @ as directed IV .STK-MED ONE


   Stop: 12/23/17 07:56


   Last Admin: 12/23/17 08:40 Dose:  Not Given


Meropenem 2 gm/ Sodium (Chloride)  100 mls @ 25 mls/hr IV Q8H [facility]


   Last Admin: 12/24/17 00:34 Dose:  25 mls/hr


Lactated Ringer's (Ringers, Lactated)  1,000 mls @ 999 mls/hr IV .BOLUS ONE


   Stop: 12/23/17 16:26


   Last Admin: 12/23/17 15:32 Dose:  999 mls/hr


Lactated Ringer's (Ringers, Lactated)  1,000 mls @ 125 mls/hr IV ASDIRECTED [facility]


   Last Admin: 12/24/17 00:21 Dose:  125 mls/hr


Iopamidol (Isovue Multipack-370 (76%))  100 ml IVPUSH ONETIME STA


   Stop: 12/22/17 16:05


   Last Admin: 12/22/17 16:05 Dose:  100 ml


Ketorolac Tromethamine (Toradol)  15 mg IVPUSH Q6H PRN


   PRN Reason: Abdominal Pain


   Stop: 12/27/17 20:29


Lidocaine (Xylocaine-Mpf 2%) Confirm Administered Dose 5 ml .ROUTE .STK-MED ONE


   Stop: 12/22/17 19:07


Meperidine HCl (Demerol)  25 mg IVPUSH ONETIME ONE


   Stop: 12/22/17 20:50


   Last Admin: 12/22/17 20:55 Dose:  25 mg


Meperidine HCl (Demerol) Confirm Administered Dose 25 mg .ROUTE .STK-MED ONE


   Stop: 12/22/17 20:51


   Last Admin: 12/22/17 22:14 Dose:  Not Given


Midazolam HCl (Versed 1 Mg/Ml) Confirm Administered Dose 2 mg .ROUTE .STK-MED 

ONE


   Stop: 12/22/17 19:08


Morphine Sulfate (Morphine)  2 mg IVPUSH ONETIME ONE


   Stop: 12/22/17 14:23


   Last Admin: 12/22/17 14:45 Dose:  2 mg


Morphine Sulfate (Morphine)  2 mg IVPUSH ONETIME ONE


   Stop: 12/22/17 17:37


   Last Admin: 12/22/17 17:56 Dose:  2 mg


Ondansetron HCl (Zofran)  4 mg IVPUSH Q6H PRN


   PRN Reason: Nausea/Vomiting


   Last Admin: 12/23/17 22:14 Dose:  4 mg


Propofol (Diprivan  20 Ml) Confirm Administered Dose 400 mg .ROUTE .STK-MED ONE


   Stop: 12/22/17 19:08


Propofol (Diprivan  20 Ml) Confirm Administered Dose 200 mg .ROUTE .STK-MED ONE


   Stop: 12/22/17 19:43


Vancomycin HCl (Vancocin) Confirm Administered Dose 1 gm .ROUTE .STK-MED ONE


   Stop: 12/22/17 19:34


   Last Admin: 12/22/17 22:14 Dose:  Not Given











- Exam


Wound/Incisions: Healing Well


Quality Assessment: Supplemental Oxygen, Central Line/PICC


General: Alert, Oriented, Mild Distress


HEENT: Mucous Membr. Moist/Pink


Neck: Supple, Trachea Midline


Lungs: Clear to Auscultation, Normal Respiratory Effort


Cardiovascular: Regular Rate, Regular Rhythm


GI/Abdominal Exam: Soft, Non-Tender.  No: Guarding, Rigid, Rebound


Extremities: Normal Inspection, Normal Range of Motion


Skin: Warm, Dry, Intact


Neurological: No New Focal Deficit


Psy/Mental Status: Alert, Anxious, Depressed





- Problem List & Annotations


(1) Cellulitis


SNOMED Code(s): 131885631


   Code(s): L03.90 - CELLULITIS, UNSPECIFIED   Status: Acute   Current Visit: 

Yes   





(2) Abscess


SNOMED Code(s): 081724563


   Code(s): L02.91 - CUTANEOUS ABSCESS, UNSPECIFIED   Status: Acute   Current 

Visit: Yes   





(3) Septic shock


SNOMED Code(s): 90870350


   Code(s): A41.9 - SEPSIS, UNSPECIFIED ORGANISM; R65.21 - SEVERE SEPSIS WITH 

SEPTIC SHOCK   Status: Acute   Current Visit: Yes   





- Problem List Review


Problem List Initiated/Reviewed/Updated: Yes





- My Orders


Last 24 Hours: 


 Active Orders 24 hr











 Category Date Time Status


 


 Communication Order [RC] ROUTINE Care  12/23/17 11:27 Active


 


 Regular Diet [DIET] Diet  12/24/17 Lunch Active


 


 Abdomen Pelvis wo Cont [CT] Stat Exams  12/23/17 08:34 Taken


 


 BMP [BASIC METABOLIC PANEL,BMP] [CHEM] AM Lab  12/25/17 05:11 Ordered


 


 CBC WITH AUTO DIFF [HEME] AM Lab  12/25/17 05:11 Ordered


 


 Acetaminophen [Tylenol] Med  12/24/17 08:29 Active





 650 mg PO Q4H PRN   


 


 Cyclobenzaprine [Flexeril] Med  12/23/17 11:45 Active





 5 mg PO BID PRN   


 


 Linezolid [Zyvox] 600 mg Med  12/24/17 09:00 Active





 Premix Bag 1 bag   





 IV Q12H   


 


 Promethazine [Phenergan] Med  12/23/17 20:09 Active





 25 mg IM Q6H PRN   


 


 Scopolamine [Transderm-Scop] Med  12/23/17 20:10 Active





 1.5 mg TRDERM Q72H PRN   


 


 Vasopressin 100 units Med  12/23/17 08:30 Active





 Sodium Chloride 0.9% [Normal Saline] 95 ml   





 IV TITRATE   


 


 oxyCODONE Med  12/24/17 08:27 Active





 10 mg PO Q4H PRN   








 Medication Orders





Acetaminophen (Tylenol)  650 mg PO Q4H PRN


   PRN Reason: Pain


Cyclobenzaprine HCl (Flexeril)  5 mg PO BID PRN


   PRN Reason: Abdominal Pain


Diphenhydramine HCl (Benadryl)  50 mg IVPUSH Q4H PRN


   PRN Reason: Itching


   Last Admin: 12/24/17 07:13  Dose: 50 mg


Hydrocortisone Sodium Succinate (Solu-Cortef)  100 mg IVPUSH Q8H [facility]


   Last Admin: 12/24/17 05:44  Dose: 100 mg


   Admin: 12/23/17 21:29  Dose: 100 mg


   Admin: 12/23/17 13:48  Dose: 100 mg


   Admin: 12/23/17 06:21  Dose: 100 mg


Hydromorphone HCl (Dilaudid)  0.5 mg IVPUSH Q1H PRN


   PRN Reason: Pain (severe 7-10)


   Last Admin: 12/24/17 03:14  Dose: 0.5 mg


Norepinephrine Bitartrate (Norepinephr-0.9% Nacl 4 Mg/250)  4 mg in 250 mls @ 

7.5 mls/hr IV TITRATE [facility]; 2 MCG/MIN


   PRN Reason: Protocol


   Last Admin: 12/24/17 08:10  Dose: 3 mcg/min, 11.25 mls/hr


   Titration: 12/24/17 07:22  Dose: 7 mcg/min, 26.25 mls/hr


   Titration: 12/24/17 01:00  Dose: 7 mcg/min, 26.25 mls/hr


   Admin: 12/23/17 22:14  Dose: 8 mcg/min, 30 mls/hr


   Titration: 12/23/17 22:14  Dose: 8 mcg/min, 30 mls/hr


   Titration: 12/23/17 20:00  Dose: 8 mcg/min, 30 mls/hr


   Admin: 12/23/17 14:03  Dose: 6 mcg/min, 22.5 mls/hr


   Titration: 12/23/17 14:03  Dose: 10 mcg/min, 37.5 mls/hr


   Admin: 12/23/17 08:40  Dose: 10 mcg/min, 37.5 mls/hr


Vasopressin 100 units/ Sodium (Chloride)  100 mls @ 0.6 mls/hr IV TITRATE [facility]; 

0.01 UNITS/MIN


   PRN Reason: Protocol


   Last Admin: 12/23/17 08:39  Dose: 0.033 units/min, 1.98 mls/hr


Linezolid 600 mg/ Premix  300 mls @ 300 mls/hr IV Q12H [facility]


Oxycodone HCl (Oxycodone)  10 mg PO Q4H PRN


   PRN Reason: Abdominal Pain


Promethazine HCl (Phenergan)  25 mg IM Q6H PRN


   PRN Reason: Vomiting


Scopolamine (Transderm-Scop)  1.5 mg TRDERM Q72H PRN


   PRN Reason: Nausea


   Last Admin: 12/23/17 20:18  Dose: 1.5 mg











- Plan


Plan                        (Free Text/Narrative):: 


-Neuro: Switched po pain medicine to oxycodone 10mg q 4hr prn pain. Tylenol 650 

mg q4hr prn pain or fever. PRN IV dilaudid 0.5mg q 1 hr for severe pain. 


-Cards: Pressors off. If they are off this afternoon her arterial line can be 

discontinued.


-Respiratory: Sating well. Encourage out of bed activity and IS use. 


-GI: Regular diet as tolerated. PRN zofran, scopalamine, promethazine


-Renal: BUN/Cr WNL. UOP adequate. Patient is 7L up for her admission. She is 

hyponatremic because of it. Sicne she is now tolerating liquids I will d/c 

maintenance IVF. Encouraged her to eat salty food today. 


-Heme: Hgb low but stable. 


-ID: Patient appears to be having a drug rash/pruritis. I would bet this is 

from the meropenem since she has tolerated vancomycin before and meropenem was 

added yesterday. Regardless will switch to linezolid since this can be 

transitioned to pill form so she can take it as an outpatient. 


-Endo: Will decrease the hydrocortisone dose today and transition to oral 

medications before discharge.

## 2017-12-25 LAB
CHLORIDE SERPL-SCNC: 100 MMOL/L (ref 98–110)
CHLORIDE SERPL-SCNC: 110 MMOL/L (ref 98–110)
CHLORIDE SERPL-SCNC: 96 MMOL/L (ref 98–110)
SODIUM SERPL-SCNC: 122 MMOL/L (ref 136–146)
SODIUM SERPL-SCNC: 126 MMOL/L (ref 136–146)
SODIUM SERPL-SCNC: 137 MMOL/L (ref 136–146)

## 2017-12-25 RX ADMIN — LINEZOLID SCH MLS/HR: 600 INJECTION, SOLUTION INTRAVENOUS at 08:02

## 2017-12-25 RX ADMIN — HYDROCORTISONE SODIUM SUCCINATE SCH MG: 100 INJECTION, POWDER, FOR SOLUTION INTRAMUSCULAR; INTRAVENOUS at 05:13

## 2017-12-25 RX ADMIN — DIPHENHYDRAMINE HYDROCHLORIDE PRN MG: 50 INJECTION, SOLUTION INTRAMUSCULAR; INTRAVENOUS at 01:27

## 2017-12-25 RX ADMIN — DIPHENHYDRAMINE HYDROCHLORIDE PRN MG: 50 INJECTION, SOLUTION INTRAMUSCULAR; INTRAVENOUS at 23:07

## 2017-12-25 RX ADMIN — HYDROCORTISONE SODIUM SUCCINATE SCH MG: 100 INJECTION, POWDER, FOR SOLUTION INTRAMUSCULAR; INTRAVENOUS at 20:08

## 2017-12-25 RX ADMIN — LINEZOLID SCH MLS/HR: 600 INJECTION, SOLUTION INTRAVENOUS at 20:07

## 2017-12-25 RX ADMIN — OMEPRAZOLE SCH MG: 20 CAPSULE, DELAYED RELEASE ORAL at 09:49

## 2017-12-25 NOTE — PCM.SURGPN
- General Info


Date of Service: 12/25/17


Date of Surgery/Procedure: 12/22/17


POD#: 3


Functional Status: Reports: Other (Complaining of sore throat. Cough drops and 

chloraseptic spray minimally helping. Was told she has GERD at one time and 

feels it is from this. Still sore around incision site. Off pressors. 

Tolerating diet. Hyponatremic this am. Has been avoiding water. )





- Review of Systems


General: Reports: No Symptoms


HEENT: Reports: Sore Throat


Pulmonary: Reports: No Symptoms


Cardiovascular: Reports: No Symptoms


Gastrointestinal: Reports: No Symptoms, Flatus, Other (Having BMs)


Genitourinary: Reports: No Symptoms


Musculoskeletal: Reports: No Symptoms


Skin: Denies: Pruritis, Rash





- Patient Data


Vitals - Most Recent: 


 Last Vital Signs











Temp  36.4 C   12/25/17 08:00


 


Pulse  74   12/23/17 21:00


 


Resp  18   12/25/17 09:00


 


BP  95/48 L  12/25/17 09:00


 


Pulse Ox  100   12/25/17 09:00











Weight - Most Recent: 66 kg


I&O - Last 24 Hours: 


 Intake & Output











 12/24/17 12/25/17 12/25/17





 22:59 06:59 14:59


 


Intake Total 300 500 


 


Output Total  2100 


 


Balance 300 -1600 











Lab Results Last 24 Hrs: 


 Laboratory Results - last 24 hr











  12/25/17 12/25/17 Range/Units





  05:43 05:43 


 


WBC  9.08   (4.0-11.0)  K/uL


 


RBC  3.37 L   (4.30-5.90)  M/uL


 


Hgb  9.8 L   (12.0-16.0)  g/dL


 


Hct  28.8 L   (36.0-46.0)  %


 


MCV  85.5   (80.0-98.0)  fL


 


MCH  29.1   (27.0-32.0)  pg


 


MCHC  34.0   (31.0-37.0)  g/dL


 


RDW Std Deviation  46.9   (28.0-62.0)  fl


 


RDW Coeff of Hung  15   (11.0-15.0)  %


 


Plt Count  187   (150-400)  K/uL


 


MPV  10.80   (7.40-12.00)  fL


 


Neut % (Auto)  92.4 H   (48.0-80.0)  %


 


Lymph % (Auto)  4.4 L   (16.0-40.0)  %


 


Mono % (Auto)  2.2   (0.0-15.0)  %


 


Eos % (Auto)  1.0   (0.0-7.0)  %


 


Baso % (Auto)  0.0   (0.0-1.5)  %


 


Neut # (Auto)  8.4 H   (1.4-5.7)  K/uL


 


Lymph # (Auto)  0.4 L   (0.6-2.4)  K/uL


 


Mono # (Auto)  0.2   (0.0-0.8)  K/uL


 


Eos # (Auto)  0.1   (0.0-0.7)  K/uL


 


Baso # (Auto)  0.0   (0.0-0.1)  K/uL


 


Nucleated RBC %  0.0   /100WBC


 


Nucleated RBCs #  0   K/uL


 


Sodium   122 L  (136-146)  mmol/L


 


Potassium   3.3 L  (3.5-5.1)  mmol/L


 


Chloride   96 L  ()  mmol/L


 


Carbon Dioxide   19 L  (21-31)  mmol/L


 


BUN   14  (6.0-23.0)  mg/dL


 


Creatinine   0.7  (0.6-1.5)  mg/dL


 


Est Cr Clr Drug Dosing   126.82  mL/min


 


Estimated GFR (MDRD)   > 60.0  ml/min


 


Glucose   80  ()  mg/dL


 


Calcium   6.6 L  (8.8-10.8)  mg/dL











Med Orders - Current: 


 Current Medications





Acetaminophen (Tylenol)  650 mg PO Q4H PRN


   PRN Reason: Pain


Benzocaine/Menthol (Cepacol Sore Throat)  1 lozenge MUCMEM ASDIRECTED PRN


   PRN Reason: Sore Throat


Calcium Carbonate/Glycine (Tums)  500 mg PO Q2HR PRN


   PRN Reason: Indigestion


   Last Admin: 12/25/17 07:20 Dose:  500 mg


Cyclobenzaprine HCl (Flexeril)  5 mg PO BID PRN


   PRN Reason: Abdominal Pain


Diphenhydramine HCl (Benadryl)  50 mg IVPUSH Q4H PRN


   PRN Reason: Itching


   Last Admin: 12/25/17 01:27 Dose:  50 mg


Enoxaparin Sodium (Lovenox)  40 mg SUBCUT Q24H [facility]


   Last Admin: 12/25/17 09:48 Dose:  40 mg


Hydrocortisone Sodium Succinate (Solu-Cortef)  25 mg IVPUSH BID [facility]


Hydromorphone HCl (Dilaudid)  0.5 mg IVPUSH Q1H PRN


   PRN Reason: Pain (severe 7-10)


   Last Admin: 12/24/17 09:00 Dose:  0.5 mg


Linezolid 600 mg/ Premix  300 mls @ 300 mls/hr IV Q12H [facility]


   Last Admin: 12/25/17 08:02 Dose:  300 mls/hr


Sodium Chloride (Normal Saline)  1,000 mls @ 100 mls/hr IV ASDIRECTED [facility]


   Last Admin: 12/25/17 09:48 Dose:  100 mls/hr


Multivitamins/Minerals/Vitamin C (Tab-A-Raquel)  1 tab PO DAILY [facility]


   Last Admin: 12/25/17 08:02 Dose:  1 tab


Omeprazole (Omeprazole)  20 mg PO ACBREAKFAST [facility]


   Last Admin: 12/25/17 09:49 Dose:  20 mg


Oxycodone HCl (Oxycodone)  10 mg PO Q4H PRN


   PRN Reason: Abdominal Pain


   Last Admin: 12/25/17 08:02 Dose:  10 mg


Phenol/Menthol (Chloraseptic Throat Spray)  0 ml MUCMEM Q2H PRN


   PRN Reason: Sore Throat


Promethazine HCl (Phenergan)  25 mg IM Q6H PRN


   PRN Reason: Vomiting


Scopolamine (Transderm-Scop)  1.5 mg TRDERM Q72H PRN


   PRN Reason: Nausea


   Last Admin: 12/23/17 20:18 Dose:  1.5 mg


Sodium Chloride (Sodium Chloride)  1 gm PO TID [facility]


   Last Admin: 12/25/17 09:49 Dose:  1 gm





Discontinued Medications





Acetaminophen (Tylenol)  325 mg PO Q4H PRN


   PRN Reason: Fever


Hydrocodone Bitart/Acetaminophen (Norco 325-5 Mg)  2 tab PO Q4H PRN


   PRN Reason: Pain (moderate 4-6)


   Last Admin: 12/24/17 08:07 Dose:  2 tab


Benzocaine/Menthol (Cepacol Sore Throat) Confirm Administered Dose 1 lozenge 

.ROUTE .STK-MED ONE


   Stop: 12/24/17 14:12


   Last Admin: 12/24/17 17:43 Dose:  Not Given


Bupivacaine HCl (Sensorcaine-Mpf 0.5%) Confirm Administered Dose 10 ml .ROUTE 

.STK-MED ONE


   Stop: 12/22/17 19:08


Calcium Gluconate (Calcium Gluconate)  1 gm IV ONETIME ONE


   Stop: 12/25/17 08:58


   Last Admin: 12/25/17 09:49 Dose:  1 gm


Cetirizine HCl (Zyrtec)  10 mg PO ONETIME ONE


   Stop: 12/24/17 08:48


   Last Admin: 12/24/17 09:40 Dose:  10 mg


Cyclobenzaprine HCl (Flexeril)  5 mg PO BID PRN


   PRN Reason: Abdominal Pain


Diphenhydramine HCl (Benadryl)  25 mg IVPUSH ONETIME ONE


   Stop: 12/22/17 20:29


   Last Admin: 12/22/17 20:38 Dose:  25 mg


Diphenhydramine HCl (Benadryl) Confirm Administered Dose 50 mg .ROUTE .STK-MED 

ONE


   Stop: 12/22/17 20:38


   Last Admin: 12/22/17 22:14 Dose:  Not Given


Factor IX (Pha) (Kcentra)  0 unit IV ONETIME ONE


   Stop: 12/23/17 03:31


   Last Admin: 12/23/17 03:54 Dose:  Not Given


Fentanyl (Sublimaze) Confirm Administered Dose 100 mcg .ROUTE .STK-MED ONE


   Stop: 12/22/17 19:08


Fentanyl (Sublimaze) Confirm Administered Dose 100 mcg .ROUTE .STK-MED ONE


   Stop: 12/22/17 19:34


Fentanyl (Sublimaze)  50 mcg IVPUSH Q5M PRN


   PRN Reason: Pain (severe 7-10)


   Stop: 12/23/17 20:21


   Last Admin: 12/22/17 20:33 Dose:  50 mcg


Hydrocortisone Sodium Succinate (Solu-Cortef)  100 mg IVPUSH Q8H [facility]


   Last Admin: 12/24/17 05:44 Dose:  100 mg


Hydrocortisone Sodium Succinate (Solu-Cortef)  50 mg IVPUSH Q8H [facility]


   Last Admin: 12/25/17 05:13 Dose:  50 mg


Hydromorphone HCl (Dilaudid)  0.5 mg IVPUSH Q1H PRN


   PRN Reason: Pain (severe 7-10)


   Last Admin: 12/22/17 21:57 Dose:  0.5 mg


Sodium Chloride (Normal Saline)  1,000 mls @ 999 mls/hr IV STAT ONE


   Stop: 12/22/17 15:21


   Last Admin: 12/22/17 14:45 Dose:  999 mls/hr


Sodium Chloride (Normal Saline)  1,000 mls @ 999 mls/hr IV STAT ONE


   Stop: 12/22/17 19:07


   Last Admin: 12/22/17 19:34 Dose:  Not Given


Metronidazole 500 mg/ Premix  100 mls @ 100 mls/hr IV ONETIME ONE


   Stop: 12/22/17 20:06


   Last Admin: 12/22/17 19:18 Dose:  100 mls/hr


Vancomycin HCl 1 gm/ Sodium (Chloride)  250 mls @ 250 mls/hr IV ONETIME ONE


   Stop: 12/22/17 20:06


   Last Admin: 12/22/17 22:13 Dose:  Not Given


Lactated Ringer's (Ringers, Lactated)  1,000 mls @ 150 mls/hr IV ASDIRECTED [facility]


Sodium Chloride (Normal Saline) Confirm Administered Dose 250 mls @ as directed 

.ROUTE .STK-MED ONE


   Stop: 12/22/17 19:37


   Last Admin: 12/22/17 22:14 Dose:  Not Given


Lactated Ringer's (Ringers, Lactated)  1,000 mls @ 150 mls/hr IV ASDIRECTED [facility]


   Last Admin: 12/22/17 23:10 Dose:  150 mls/hr


Vancomycin HCl 0.75 gm/ Sodium (Chloride)  250 mls @ 166 mls/hr IV Q12H [facility]


Metronidazole 250 mg/ Premix  50 mls @ 50 mls/hr IV QID [facility]


   Last Admin: 12/22/17 23:46 Dose:  50 mls/hr


Acetaminophen 1,000 mg/ Premix  100 mls @ 400 mls/hr IV NOW ONE


   Stop: 12/22/17 21:19


   Last Admin: 12/22/17 22:15 Dose:  Not Given


Acetaminophen (Ofirmev) Confirm Administered Dose 100 mls @ as directed IV .STK-

MED ONE


   Stop: 12/22/17 21:15


   Last Admin: 12/22/17 22:15 Dose:  Not Given


Lactated Ringer's (Ringers, Lactated)  1,000 mls @ 1,000 mls/hr IV .BOLUS ONE


   Stop: 12/22/17 22:49


   Last Admin: 12/22/17 22:17 Dose:  1,000 mls/hr


Lactated Ringer's (Ringers, Lactated)  1,000 mls @ 999 mls/hr IV .BOLUS ONE


   Stop: 12/23/17 01:46


   Last Admin: 12/23/17 00:51 Dose:  999 mls/hr


Cefuroxime Sodium 1.5 gm/ (Sodium Chloride)  50 mls @ 100 mls/hr IV Q8HR [facility]


Cefuroxime Sodium 1.5 gm/ (Sodium Chloride)  50 mls @ 100 mls/hr IV Q8H [facility]


   Last Admin: 12/23/17 02:14 Dose:  100 mls/hr


Vancomycin HCl 1 gm/ Sodium (Chloride)  250 mls @ 166 mls/hr IV Q8H [facility]


   Last Admin: 12/24/17 08:06 Dose:  166 mls/hr


Potassium Chloride 40 meq/ (Sodium Chloride)  500 mls @ 125 mls/hr IV ONETIME 

ONE


   Stop: 12/23/17 07:59


   Last Admin: 12/23/17 04:49 Dose:  125 mls/hr


Lactated Ringer's (Ringers, Lactated)  1,000 mls @ 999 mls/hr IV .BOLUS ONE


   Stop: 12/23/17 06:55


   Last Admin: 12/23/17 06:21 Dose:  999 mls/hr


Norepinephrine Bitartrate (Norepinephr-0.9% Nacl 4 Mg/250)  4 mg in 250 mls @ 

7.5 mls/hr IV TITRATE [facility]; 2 MCG/MIN


   PRN Reason: Protocol


   Last Admin: 12/24/17 08:10 Dose:  3 mcg/min, 11.25 mls/hr


Norepinephrine Bitartrate (Norepinephr-0.9% Nacl 4 Mg/250) Confirm Administered 

Dose 4 mg in 250 mls @ as directed IV .STK-MED ONE


   Stop: 12/23/17 07:56


   Last Admin: 12/23/17 08:40 Dose:  Not Given


Vasopressin 100 units/ Sodium (Chloride)  100 mls @ 0.6 mls/hr IV TITRATE [facility]; 

0.01 UNITS/MIN


   PRN Reason: Protocol


   Last Admin: 12/23/17 08:39 Dose:  0.033 units/min, 1.98 mls/hr


Meropenem 2 gm/ Sodium (Chloride)  100 mls @ 25 mls/hr IV Q8H [facility]


   Last Admin: 12/24/17 00:34 Dose:  25 mls/hr


Lactated Ringer's (Ringers, Lactated)  1,000 mls @ 999 mls/hr IV .BOLUS ONE


   Stop: 12/23/17 16:26


   Last Admin: 12/23/17 15:32 Dose:  999 mls/hr


Lactated Ringer's (Ringers, Lactated)  1,000 mls @ 125 mls/hr IV ASDIRECTED [facility]


   Last Admin: 12/24/17 00:21 Dose:  125 mls/hr


Furosemide 10 mg/ Sodium (Chloride)  51 mls @ 100 mls/hr IV ONETIME ONE


   Stop: 12/25/17 09:45


Iopamidol (Isovue Multipack-370 (76%))  100 ml IVPUSH ONETIME STA


   Stop: 12/22/17 16:05


   Last Admin: 12/22/17 16:05 Dose:  100 ml


Ketorolac Tromethamine (Toradol)  15 mg IVPUSH Q6H PRN


   PRN Reason: Abdominal Pain


   Stop: 12/27/17 20:29


Lidocaine (Xylocaine-Mpf 2%) Confirm Administered Dose 5 ml .ROUTE .STK-MED ONE


   Stop: 12/22/17 19:07


Meperidine HCl (Demerol)  25 mg IVPUSH ONETIME ONE


   Stop: 12/22/17 20:50


   Last Admin: 12/22/17 20:55 Dose:  25 mg


Meperidine HCl (Demerol) Confirm Administered Dose 25 mg .ROUTE .STK-MED ONE


   Stop: 12/22/17 20:51


   Last Admin: 12/22/17 22:14 Dose:  Not Given


Midazolam HCl (Versed 1 Mg/Ml) Confirm Administered Dose 2 mg .ROUTE .STK-MED 

ONE


   Stop: 12/22/17 19:08


Morphine Sulfate (Morphine)  2 mg IVPUSH ONETIME ONE


   Stop: 12/22/17 14:23


   Last Admin: 12/22/17 14:45 Dose:  2 mg


Morphine Sulfate (Morphine)  2 mg IVPUSH ONETIME ONE


   Stop: 12/22/17 17:37


   Last Admin: 12/22/17 17:56 Dose:  2 mg


Ondansetron HCl (Zofran)  4 mg IVPUSH Q6H PRN


   PRN Reason: Nausea/Vomiting


   Last Admin: 12/23/17 22:14 Dose:  4 mg


Phenol/Menthol (Chloraseptic Throat Spray) Confirm Administered Dose 177 ml 

.ROUTE .STK-MED ONE


   Stop: 12/24/17 14:13


   Last Admin: 12/24/17 17:43 Dose:  Not Given


Potassium Chloride (Potassium Chloride)  40 meq PO ONETIME ONE


   Stop: 12/25/17 08:51


   Last Admin: 12/25/17 09:49 Dose:  40 meq


Propofol (Diprivan  20 Ml) Confirm Administered Dose 400 mg .ROUTE .STK-MED ONE


   Stop: 12/22/17 19:08


Propofol (Diprivan  20 Ml) Confirm Administered Dose 200 mg .ROUTE .STK-MED ONE


   Stop: 12/22/17 19:43


Vancomycin HCl (Vancocin) Confirm Administered Dose 1 gm .ROUTE .STK-MED ONE


   Stop: 12/22/17 19:34


   Last Admin: 12/22/17 22:14 Dose:  Not Given











- Exam


Wound/Incisions: Healing Well, Dressing Dry and Intact, No Drainage


Quality Assessment: Supplemental Oxygen


General: Alert, Oriented, Cooperative


HEENT: Pupils Equal, Pupils Reactive


Lungs: Clear to Auscultation, Normal Respiratory Effort


Cardiovascular: Regular Rate, Regular Rhythm


GI/Abdominal Exam: Soft, Non-Tender, No Distention, No Mass


Extremities: Normal Inspection, Normal Range of Motion


Skin: Other (Red all over. Doesnt appear to be a rash. Blanches with pressure. )


Psy/Mental Status: Alert, Normal Affect, Normal Mood





- Problem List & Annotations


(1) Cellulitis


SNOMED Code(s): 787486122


   Code(s): L03.90 - CELLULITIS, UNSPECIFIED   Status: Acute   Current Visit: 

Yes   





(2) Abscess


SNOMED Code(s): 651917580


   Code(s): L02.91 - CUTANEOUS ABSCESS, UNSPECIFIED   Status: Acute   Current 

Visit: Yes   





(3) Septic shock


SNOMED Code(s): 92723231


   Code(s): A41.9 - SEPSIS, UNSPECIFIED ORGANISM; R65.21 - SEVERE SEPSIS WITH 

SEPTIC SHOCK   Status: Acute   Current Visit: Yes   





- Problem List Review


Problem List Initiated/Reviewed/Updated: Yes





- My Orders


Last 24 Hours: 


 Active Orders 24 hr











 Category Date Time Status


 


 Regular Diet [DIET] Diet  12/24/17 Lunch Active


 


 BASIC METABOLIC PANEL,BMP [CHEM] Routine Lab  12/25/17 20:00 Ordered


 


 SODIUM,NA [CHEM] Routine Lab  12/25/17 12:00 Ordered


 


 Benzocaine/Cetylpyrd/Menthol [Cepacol Sore Throat] Med  12/24/17 17:41 Active





 1 lozenge MUCMEM ASDIRECTED PRN   


 


 Calcium Carbonate [Tums] Med  12/24/17 19:05 Active





 500 mg PO Q2HR PRN   


 


 Enoxaparin [Lovenox] Med  12/25/17 10:00 Active





 40 mg SUBCUT Q24H   


 


 Hydrocortisone Sod Succinate [Solu-CORTEF] Med  12/25/17 21:00 Active





 25 mg IVPUSH BID   


 


 Linezolid [Zyvox] 600 mg Med  12/24/17 09:00 Active





 Premix Bag 1 bag   





 IV Q12H   


 


 Multivitamins [Tab-A-Raquel] Med  12/24/17 09:30 Active





 1 tab PO DAILY   


 


 Omeprazole Med  12/25/17 09:00 Active





 20 mg PO ACBREAKFAST   


 


 Phenol [Chloraseptic Throat Spray] Med  12/24/17 17:37 Active





 See Dose Instructions  MUCMEM Q2H PRN   


 


 Sodium Chloride Med  12/25/17 09:30 Active





 1 gm PO TID   


 


 Sodium Chloride 0.9% [Normal Saline] 1,000 ml Med  12/25/17 09:00 Active





 IV ASDIRECTED   








 Medication Orders





Acetaminophen (Tylenol)  650 mg PO Q4H PRN


   PRN Reason: Pain


Benzocaine/Menthol (Cepacol Sore Throat)  1 lozenge MUCMEM ASDIRECTED PRN


   PRN Reason: Sore Throat


Calcium Carbonate/Glycine (Tums)  500 mg PO Q2HR PRN


   PRN Reason: Indigestion


   Last Admin: 12/25/17 07:20  Dose: 500 mg


   Admin: 12/24/17 19:54  Dose: 500 mg


Cyclobenzaprine HCl (Flexeril)  5 mg PO BID PRN


   PRN Reason: Abdominal Pain


Diphenhydramine HCl (Benadryl)  50 mg IVPUSH Q4H PRN


   PRN Reason: Itching


   Last Admin: 12/25/17 01:27  Dose: 50 mg


   Admin: 12/24/17 19:54  Dose: 50 mg


   Admin: 12/24/17 07:13  Dose: 50 mg


Enoxaparin Sodium (Lovenox)  40 mg SUBCUT Q24H [facility]


   Last Admin: 12/25/17 09:48  Dose: 40 mg


Hydrocortisone Sodium Succinate (Solu-Cortef)  25 mg IVPUSH BID [facility]


Hydromorphone HCl (Dilaudid)  0.5 mg IVPUSH Q1H PRN


   PRN Reason: Pain (severe 7-10)


   Last Admin: 12/24/17 09:00  Dose: 0.5 mg


   Admin: 12/24/17 03:14  Dose: 0.5 mg


Linezolid 600 mg/ Premix  300 mls @ 300 mls/hr IV Q12H [facility]


   Last Admin: 12/25/17 08:02  Dose: 300 mls/hr


   Infusion: 12/24/17 21:13  Dose: 300 mls/hr


   Admin: 12/24/17 20:13  Dose: 300 mls/hr


   Infusion: 12/24/17 10:40  Dose: 300 mls/hr


   Admin: 12/24/17 09:40  Dose: 300 mls/hr


Sodium Chloride (Normal Saline)  1,000 mls @ 100 mls/hr IV ASDIRECTED [facility]


   Last Admin: 12/25/17 09:48  Dose: 100 mls/hr


Multivitamins/Minerals/Vitamin C (Tab-A-Raquel)  1 tab PO DAILY [facility]


   Last Admin: 12/25/17 08:02  Dose: 1 tab


   Admin: 12/24/17 09:42  Dose: 1 tab


Omeprazole (Omeprazole)  20 mg PO ACBREAKFAST [facility]


   Last Admin: 12/25/17 09:49  Dose: 20 mg


Oxycodone HCl (Oxycodone)  10 mg PO Q4H PRN


   PRN Reason: Abdominal Pain


   Last Admin: 12/25/17 08:02  Dose: 10 mg


   Admin: 12/24/17 19:54  Dose: 10 mg


Phenol/Menthol (Chloraseptic Throat Spray)  0 ml MUCMEM Q2H PRN


   PRN Reason: Sore Throat


Promethazine HCl (Phenergan)  25 mg IM Q6H PRN


   PRN Reason: Vomiting


Scopolamine (Transderm-Scop)  1.5 mg TRDERM Q72H PRN


   PRN Reason: Nausea


   Last Admin: 12/23/17 20:18  Dose: 1.5 mg


Sodium Chloride (Sodium Chloride)  1 gm PO TID [facility]


   Last Admin: 12/25/17 09:49  Dose: 1 gm

## 2017-12-26 VITALS — DIASTOLIC BLOOD PRESSURE: 66 MMHG | SYSTOLIC BLOOD PRESSURE: 103 MMHG

## 2017-12-26 LAB
CHLORIDE SERPL-SCNC: 113 MMOL/L (ref 98–110)
SODIUM SERPL-SCNC: 141 MMOL/L (ref 136–146)

## 2017-12-26 RX ADMIN — LINEZOLID SCH MLS/HR: 600 INJECTION, SOLUTION INTRAVENOUS at 08:18

## 2017-12-26 RX ADMIN — HYDROCORTISONE SODIUM SUCCINATE SCH MG: 100 INJECTION, POWDER, FOR SOLUTION INTRAMUSCULAR; INTRAVENOUS at 08:18

## 2017-12-26 RX ADMIN — OMEPRAZOLE SCH MG: 20 CAPSULE, DELAYED RELEASE ORAL at 06:32

## 2017-12-26 NOTE — CR
EXAM DATE: 17



PATIENT'S AGE: 27





Patient: KINA CARVALHO



Facility: [place], [place]

Patient ID: [id number]

Site Patient ID: [id number].

Site Accession #: [id number]

: 1990

Study: XRay Chest [id number]/23/2017 8:36:19 AM

Ordering Physician: David Bustillo



Final Report: 

INDICATION:

Central line placement. Sepsis.



Technique:

AP portable chest x-ray.



Comparison:

Chest x-ray 2017.



Findings:

Right central line is been placed with tip in mid SVC. No pneumothorax. Heart 
size normal. Mild increased bronchovascular markings in the lungs suggesting a 
component of pulmonary venous congestion. The increased pulmonary vascularity 
is greatest in the upper lungs. Mild interstitial prominence in the lungs 
nonspecific. No focal dense infiltrate or consolidation in either lung. Chest 
otherwise unremarkable.





Dictated by Natanael Alarcon MD @ Dec 23 2017 8:46AM

(Electronic Signature)



Report Signed by Proxy.
DIANNA

## 2017-12-26 NOTE — PCM.SN
- Free Text/Narrative


Note: 


Ruth Be was admitted under my service on December 22, 2017 as a same-day 

admission. I was informed after the procedure that the patient would need to be 

referred to observation. The order was changed. The patient had a small groin 

abscess and I anticipated that she would discharge home the next day. 

Postoperatively the patient went into septic shock requiring admission to the 

ICU. At that time she was admitted to the ICU I was informed that I would need 

to change the patient's status to inpatient. This should have happened on 

December 23, 2017 at 1 AM. Unfortunately the patient became more and more 

unstable overnight requiring bedside care and multiple orders to be entered. 

During this time I forgot to discontinue my patients status "referred to 

observation" to place the inpatient order. This was not noticed until December 26 on her day of discharge.

## 2017-12-26 NOTE — CT
EXAM DATE: 17



PATIENT'S AGE: 27





Patient: KINA CARVALHO



Facility: [place], [place]

Patient ID: [id number]

Site Patient ID: [id number].

Site Accession #: [id number]

: 1990

Study: CT Abdomen/Pelvis [id number]/23/2017 9:40:12 AM

Ordering Physician: David Bustillo



Final Report: 

Indication:

Septic shock, unknown source.



Technique:

Contiguous axial images were obtained from the domes of the diaphragm through 
the pubic symphysis . 3D rendering, including image post processing was 
performed on an independent workstation.



Comparison:

No CT comparison.



Findings:

Atelectatic changes and linear scar/ atelectasis is noted in both lower lungs. 
There is a trace pericardial effusion.

The unenhanced liver, spleen, pancreas and adrenal glands are unremarkable. 
Gallbladder is mildly distended. There is no intrahepatic or extrahepatic 
biliary dilatation.

Contrast material is noted in the collecting system of both kidneys and the 
ureters. Kidneys and ureters are otherwise unremarkable. Contrast filled 
urinary bladder is unremarkable.

The unenhanced uterus and adnexa are unremarkable. There is no abdominal or 
pelvic ascites. Abundant feces is seen throughout the colon but there is no 
intestinal obstruction nor free intraperitoneal air.

Extensive infiltration and a soft tissue defect is noted of the skin and 
subcutaneous tissues of the left groin with a high attenuation collection in 
the soft tissues spanning approximately 3.9 cm which could reflect a hematoma.



Impression:

1. Subcutaneous infiltration and probable hematoma in subcutaneous tissues 
overlying left groin with this soft tissue defect.

2. Gallbladder mildly dilated and of uncertain clinical significance.

3. Constipation.

4. Trace pericardial effusion.



Please note that all CT scans at this facility use dose modulation, iterative 
reconstruction, and/or weight-based dosing when appropriate to reduce radiation 
dose to as low as reasonably achievable.



Dictated by Matilda Cleary MD @ Dec 23 2017 10:09AM

(Electronic Signature)



Report Signed by Proxy.
[facility]

## 2017-12-26 NOTE — CT
EXAM DATE: 17



PATIENT'S AGE: 27





Patient: KINA CARVALHO



Facility: [place], [place]

Patient ID: [id number]

Site Patient ID: [id number]

Site Accession #: [id number]

: 1990

Study: CT Abdomen/Pelvis [id number]/22/2017 5:17:55 PM

Ordering Physician: Doctor Temp



Final Report: 

INDICATION:

Suprapubic cyst 



TECHNIQUE:

CT abdomen and pelvis acquired with IV contrast.



COMPARISON:

None available 



FINDINGS:

Lower chest: Unremarkable. 

Liver: Unremarkable. 

Spleen: Unremarkable. 

Pancreas: Unremarkable. 

Gallbladder and bile ducts: Mild prominence off central intrahepatic ducts 
without significant extrahepatic biliary dilatation, possibly physiologic. 
Unremarkable gallbladder. 

Adrenal glands: Unremarkable. 

Kidneys: Unremarkable. 

GI tract: No bowel obstruction. No evidence of appendicitis. No significant 
pericolonic changes. A 1.6 x 1.2 centimeter soft tissue nodule with central 
hypodensity in the right perirectal space on image 124, with adjacent stranding
, suggestive of a necrotic lymph node. Additional adjacent subcentimeter lymph 
nodes. 

Vascular structures: Unremarkable. 

Lymph nodes: Enlarged left external iliac lymph nodes measuring up to 2.0 x 1.3 
centimeters. Enlarged left inguinal lymph nodes and shotty right inguinal lymph 
nodes. 

Miscellaneous: Small fluid in the cul-de-sac. No free air. A 3.7 x 2.4 
centimeter fluid attenuation structure with at least partial peripheral 
enhancement in the left inguinal subcutaneous fat with adjacent subcutaneous 
edema and stranding extending to the labial region. 

Pelvic Organs: No discrete uterine or bladder abnormality seen. A 1.7 
centimeter left adnexal low-density structure, likely a dominant follicle. 

Bones: Unremarkable for age. 



IMPRESSION:

A 3.7 centimeter left inguinal peripherally enhancing collection, suggestive of 
an abscess. Correlate with aspiration. Underlying enlarged left inguinal lymph 
nodes as well as enlarged left external iliac lymph nodes. 

A partially necrotic right perirectal lymph node with adjacent stranding.



Dictated by Hung Youssef MD @ 2017 5:43:01 PM





Dictated by: Hung Youssef MD @ 2017 17:43:43

(Electronic Signature)



Report Signed by Proxy.
[facility]

## 2017-12-26 NOTE — [id number]
**Discharge Summary





- Hospital Course


Free Text/Narrative:: 


Patient is a 27 rolled female who presented to the emergency room with 

cellulitis and abscess in her left groin. She developed pain and swelling in 

her left groin 2 weeks performed was on clindamycin. Earlier this week she had 

seen her primary care provider who performed an ultrasound. Ultrasound showed a 

nondescript mass in the left groin. She was told to continue her antibiotics 

but if things got worse to come to the emergency room for reevaluation. On 

admission to the ER a CT was performed that showed a abscess in the left groin. 

Decision was made to perform an incision and drainage of this mass. Patient is 

taken to the operating room and had an incision and drainage of a 2 x 2 by 2 cm 

mass in the left groin. Postoperatively the patient became febrile to 105 and 

went into septic shock requiring pressors and admission to the ICU. After 24 

hours the pressors were able to be weaned. This is only after giving her a 

stress dose of steroids since she had previously been on a Medrol Dosepak.  She 

was switched to linezolid so that she could take oral pills as an outpatient as 

opposed to coming in for daily vancomycin infusions. She has a history of MRSA 

infections and I believe that this was an MRSA infection again. The cultures 

did not grow any organisms.


The patient had several issues as well as an inpatient. Postop day 2 she 

developed hyponatremia. This was resolved by giving her normal saline and salt 

tabs. The wound continues to appear healthy with some granulation tissue to the 

base.  She developed severe reflux and was started on omeprazole with good 

resolution of her throat pain. She developed itching and a rash to meropenem. 

She had nausea and vomiting on POD #1 which resolved with scopalamine and 

zofran. 


Her vitals are stable this morning. Her pain is well controlled on percocet. 

She is tolerating a diet. She is having adequate UOP. She will be discharged 

home.  





- Discharge Data


Discharge Date: 12/26/17


Discharge Disposition: Home, Self-Care 01


Condition: Stable





- Discharge Diagnosis/Problem(s)


(1) Cellulitis


SNOMED Code(s): 653212843


   ICD Code: L03.90 - CELLULITIS, UNSPECIFIED   Status: Acute   Current Visit: 

Yes   





(2) Abscess


SNOMED Code(s): 272231136


   ICD Code: L02.91 - CUTANEOUS ABSCESS, UNSPECIFIED   Status: Acute   Current 

Visit: Yes   





(3) Septic shock


SNOMED Code(s): 14731427


   ICD Code: A41.9 - SEPSIS, UNSPECIFIED ORGANISM; R65.21 - SEVERE SEPSIS WITH 

SEPTIC SHOCK   Status: Acute   Current Visit: Yes   





- Patient Summary/Data


Operative Procedure(s) Performed: Incision and drainage left groin abscess





- Patient Instructions


Diet: Regular Diet as Tolerated


Activity: Rest and Relax Today


Driving: Do Not Drive (while on narcotics )


Showering/Bathing: May Shower


Notify Provider of: Fever, Increased Pain, Swelling and Redness, Drainage, 

Nausea and/or Vomiting





- Discharge Plan


Prescriptions/Med Rec: 


Multivitamins [Tab-A-Raquel] 1 tab PO DAILY #30 tablet


Omeprazole 20 mg PO ACBREAKFAST #15 cap.cr


Home Medications: 


 Home Meds





Multivitamins [Tab-A-Raquel] 1 tab PO DAILY #30 tablet 12/26/17 [Rx]


Omeprazole 20 mg PO ACBREAKFAST #15 cap.cr 12/26/17 [Rx]








Patient Handouts:  Oxycodone tablets or capsules, Abscess, Easy-to-Read, 

Linezolid tablets, Multivitamin with Minerals and Iron formulations (oral solid 

dosage forms), Incision and Drainage, Care After, Omeprazole tablets (OTC)


Referrals: 


Aurelio Seth MD [Primary Care Provider] - 01/02/18 11:30 am


Iwona Hernandez MD [Physician] - 01/02/18 1:45 pm (Please arrive for check in 

at 1:30 PM)





- General Info


Date of Service: 12/26/17


Functional Status: Reports: Pain Controlled, Tolerating Diet, Ambulating, 

Urinating





- Review of Systems


General: Reports: No Symptoms


HEENT: Reports: No Symptoms


Pulmonary: Reports: No Symptoms


Cardiovascular: Reports: No Symptoms


Gastrointestinal: Reports: No Symptoms





- Patient Data


Vitals - Most Recent: 


 Last Vital Signs











Temp  37 C   12/26/17 04:00


 


Pulse  74   12/23/17 21:00


 


Resp  18   12/26/17 07:00


 


BP  113/52 L  12/26/17 07:00


 


Pulse Ox  95   12/26/17 07:00











Weight - Most Recent: 65 kg


I&O - Last 24 hours: 


 Intake & Output











 12/25/17 12/26/17 12/26/17





 22:59 06:59 14:59


 


Intake Total 2230 600 


 


Output Total 5050 7110 


 


Balance -2820 -2300 











Lab Results - Last 24 hrs: 


 Laboratory Results - last 24 hr











  12/25/17 12/25/17 12/26/17 Range/Units





  10:45 15:25 05:07 


 


WBC    4.13  (4.0-11.0)  K/uL


 


RBC    3.28 L  (4.30-5.90)  M/uL


 


Hgb    9.6 L  (12.0-16.0)  g/dL


 


Hct    28.4 L  (36.0-46.0)  %


 


MCV    86.6  (80.0-98.0)  fL


 


MCH    29.3  (27.0-32.0)  pg


 


MCHC    33.8  (31.0-37.0)  g/dL


 


RDW Std Deviation    48.0  (28.0-62.0)  fl


 


RDW Coeff of Hung    15  (11.0-15.0)  %


 


Plt Count    100 L  (150-400)  K/uL


 


MPV    11.30  (7.40-12.00)  fL


 


Nucleated RBC %    0.0  /100WBC


 


Nucleated RBCs #    0  K/uL


 


Sodium  126 L  137   (136-146)  mmol/L


 


Potassium  3.5  3.6   (3.5-5.1)  mmol/L


 


Chloride  100  110   ()  mmol/L


 


Carbon Dioxide  17 L  19 L   (21-31)  mmol/L


 


BUN  14  14   (6.0-23.0)  mg/dL


 


Creatinine  0.7  0.8   (0.6-1.5)  mg/dL


 


Est Cr Clr Drug Dosing  126.82  110.97   mL/min


 


Estimated GFR (MDRD)  > 60.0  > 60.0   ml/min


 


Glucose  89  95   ()  mg/dL


 


Calcium  7.4 L  7.6 L   (8.8-10.8)  mg/dL














  12/26/17 Range/Units





  05:07 


 


WBC   (4.0-11.0)  K/uL


 


RBC   (4.30-5.90)  M/uL


 


Hgb   (12.0-16.0)  g/dL


 


Hct   (36.0-46.0)  %


 


MCV   (80.0-98.0)  fL


 


MCH   (27.0-32.0)  pg


 


MCHC   (31.0-37.0)  g/dL


 


RDW Std Deviation   (28.0-62.0)  fl


 


RDW Coeff of Hung   (11.0-15.0)  %


 


Plt Count   (150-400)  K/uL


 


MPV   (7.40-12.00)  fL


 


Nucleated RBC %   /100WBC


 


Nucleated RBCs #   K/uL


 


Sodium  141  (136-146)  mmol/L


 


Potassium  3.3 L  (3.5-5.1)  mmol/L


 


Chloride  113 H  ()  mmol/L


 


Carbon Dioxide  20 L  (21-31)  mmol/L


 


BUN  12  (6.0-23.0)  mg/dL


 


Creatinine  0.7  (0.6-1.5)  mg/dL


 


Est Cr Clr Drug Dosing  124.97  mL/min


 


Estimated GFR (MDRD)  > 60.0  ml/min


 


Glucose  83  ()  mg/dL


 


Calcium  7.7 L  (8.8-10.8)  mg/dL











Med Orders - Current: 


 Current Medications





Acetaminophen (Tylenol)  650 mg PO Q4H PRN


   PRN Reason: Pain


Benzocaine/Menthol (Cepacol Sore Throat)  1 lozenge MUCMEM ASDIRECTED PRN


   PRN Reason: Sore Throat


Calcium Carbonate/Glycine (Tums)  500 mg PO Q2HR PRN


   PRN Reason: Indigestion


   Last Admin: 12/25/17 14:18 Dose:  500 mg


Cyclobenzaprine HCl (Flexeril)  5 mg PO BID PRN


   PRN Reason: Abdominal Pain


Diphenhydramine HCl (Benadryl)  50 mg IVPUSH Q4H PRN


   PRN Reason: Itching


   Last Admin: 12/25/17 23:07 Dose:  50 mg


Enoxaparin Sodium (Lovenox)  40 mg SUBCUT Q24H [facility]


   Last Admin: 12/25/17 09:48 Dose:  40 mg


Hydrocortisone Sodium Succinate (Solu-Cortef)  25 mg IVPUSH BID [facility]


   Last Admin: 12/26/17 08:18 Dose:  25 mg


Hydromorphone HCl (Dilaudid)  0.5 mg IVPUSH Q1H PRN


   PRN Reason: Pain (severe 7-10)


   Last Admin: 12/24/17 09:00 Dose:  0.5 mg


Linezolid 600 mg/ Premix  300 mls @ 300 mls/hr IV Q12H [facility]


   Last Admin: 12/26/17 08:18 Dose:  300 mls/hr


Multivitamins/Minerals/Vitamin C (Tab-A-Raquel)  1 tab PO DAILY [facility]


   Last Admin: 12/25/17 08:02 Dose:  1 tab


Omeprazole (Omeprazole)  20 mg PO ACBREAKFAST [facility]


   Last Admin: 12/26/17 06:32 Dose:  20 mg


Oxycodone HCl (Oxycodone)  10 mg PO Q4H PRN


   PRN Reason: Abdominal Pain


   Last Admin: 12/25/17 20:40 Dose:  10 mg


Phenol/Menthol (Chloraseptic Throat Spray)  0 ml MUCMEM Q2H PRN


   PRN Reason: Sore Throat


Promethazine HCl (Phenergan)  25 mg IM Q6H PRN


   PRN Reason: Vomiting


Scopolamine (Transderm-Scop)  1.5 mg TRDERM Q72H PRN


   PRN Reason: Nausea


   Last Admin: 12/23/17 20:18 Dose:  1.5 mg


Sodium Chloride (Sodium Chloride)  1 gm PO TID [facility]


   Last Admin: 12/26/17 05:20 Dose:  1 gm





Discontinued Medications





Acetaminophen (Tylenol)  325 mg PO Q4H PRN


   PRN Reason: Fever


Hydrocodone Bitart/Acetaminophen (Norco 325-5 Mg)  2 tab PO Q4H PRN


   PRN Reason: Pain (moderate 4-6)


   Last Admin: 12/24/17 08:07 Dose:  2 tab


Benzocaine/Menthol (Cepacol Sore Throat) Confirm Administered Dose 1 lozenge 

.ROUTE .STK-MED ONE


   Stop: 12/24/17 14:12


   Last Admin: 12/24/17 17:43 Dose:  Not Given


Bupivacaine HCl (Sensorcaine-Mpf 0.5%) Confirm Administered Dose 10 ml .ROUTE 

.STK-MED ONE


   Stop: 12/22/17 19:08


Calcium Gluconate (Calcium Gluconate)  1 gm IV ONETIME ONE


   Stop: 12/25/17 08:58


   Last Admin: 12/25/17 09:49 Dose:  1 gm


Cetirizine HCl (Zyrtec)  10 mg PO ONETIME ONE


   Stop: 12/24/17 08:48


   Last Admin: 12/24/17 09:40 Dose:  10 mg


Cyclobenzaprine HCl (Flexeril)  5 mg PO BID PRN


   PRN Reason: Abdominal Pain


Diphenhydramine HCl (Benadryl)  25 mg IVPUSH ONETIME ONE


   Stop: 12/22/17 20:29


   Last Admin: 12/22/17 20:38 Dose:  25 mg


Diphenhydramine HCl (Benadryl) Confirm Administered Dose 50 mg .ROUTE .STK-MED 

ONE


   Stop: 12/22/17 20:38


   Last Admin: 12/22/17 22:14 Dose:  Not Given


Factor IX (Pha) (Kcentra)  0 unit IV ONETIME ONE


   Stop: 12/23/17 03:31


   Last Admin: 12/23/17 03:54 Dose:  Not Given


Fentanyl (Sublimaze) Confirm Administered Dose 100 mcg .ROUTE .STK-MED ONE


   Stop: 12/22/17 19:08


Fentanyl (Sublimaze) Confirm Administered Dose 100 mcg .ROUTE .STK-MED ONE


   Stop: 12/22/17 19:34


Fentanyl (Sublimaze)  50 mcg IVPUSH Q5M PRN


   PRN Reason: Pain (severe 7-10)


   Stop: 12/23/17 20:21


   Last Admin: 12/22/17 20:33 Dose:  50 mcg


Hydrocortisone Sodium Succinate (Solu-Cortef)  100 mg IVPUSH Q8H [facility]


   Last Admin: 12/24/17 05:44 Dose:  100 mg


Hydrocortisone Sodium Succinate (Solu-Cortef)  50 mg IVPUSH Q8H [facility]


   Last Admin: 12/25/17 05:13 Dose:  50 mg


Hydromorphone HCl (Dilaudid)  0.5 mg IVPUSH Q1H PRN


   PRN Reason: Pain (severe 7-10)


   Last Admin: 12/22/17 21:57 Dose:  0.5 mg


Sodium Chloride (Normal Saline)  1,000 mls @ 999 mls/hr IV STAT ONE


   Stop: 12/22/17 15:21


   Last Admin: 12/22/17 14:45 Dose:  999 mls/hr


Sodium Chloride (Normal Saline)  1,000 mls @ 999 mls/hr IV STAT ONE


   Stop: 12/22/17 19:07


   Last Admin: 12/22/17 19:34 Dose:  Not Given


Metronidazole 500 mg/ Premix  100 mls @ 100 mls/hr IV ONETIME ONE


   Stop: 12/22/17 20:06


   Last Admin: 12/22/17 19:18 Dose:  100 mls/hr


Vancomycin HCl 1 gm/ Sodium (Chloride)  250 mls @ 250 mls/hr IV ONETIME ONE


   Stop: 12/22/17 20:06


   Last Admin: 12/22/17 22:13 Dose:  Not Given


Lactated Ringer's (Ringers, Lactated)  1,000 mls @ 150 mls/hr IV ASDIRECTED [facility]


Sodium Chloride (Normal Saline) Confirm Administered Dose 250 mls @ as directed 

.ROUTE .STK-MED ONE


   Stop: 12/22/17 19:37


   Last Admin: 12/22/17 22:14 Dose:  Not Given


Lactated Ringer's (Ringers, Lactated)  1,000 mls @ 150 mls/hr IV ASDIRECTED [facility]


   Last Admin: 12/22/17 23:10 Dose:  150 mls/hr


Vancomycin HCl 0.75 gm/ Sodium (Chloride)  250 mls @ 166 mls/hr IV Q12H [facility]


Metronidazole 250 mg/ Premix  50 mls @ 50 mls/hr IV QID [facility]


   Last Admin: 12/22/17 23:46 Dose:  50 mls/hr


Acetaminophen 1,000 mg/ Premix  100 mls @ 400 mls/hr IV NOW ONE


   Stop: 12/22/17 21:19


   Last Admin: 12/22/17 22:15 Dose:  Not Given


Acetaminophen (Ofirmev) Confirm Administered Dose 100 mls @ as directed IV .STK-

MED ONE


   Stop: 12/22/17 21:15


   Last Admin: 12/22/17 22:15 Dose:  Not Given


Lactated Ringer's (Ringers, Lactated)  1,000 mls @ 1,000 mls/hr IV .BOLUS ONE


   Stop: 12/22/17 22:49


   Last Admin: 12/22/17 22:17 Dose:  1,000 mls/hr


Lactated Ringer's (Ringers, Lactated)  1,000 mls @ 999 mls/hr IV .BOLUS ONE


   Stop: 12/23/17 01:46


   Last Admin: 12/23/17 00:51 Dose:  999 mls/hr


Cefuroxime Sodium 1.5 gm/ (Sodium Chloride)  50 mls @ 100 mls/hr IV Q8HR [facility]


Cefuroxime Sodium 1.5 gm/ (Sodium Chloride)  50 mls @ 100 mls/hr IV Q8H [facility]


   Last Admin: 12/23/17 02:14 Dose:  100 mls/hr


Vancomycin HCl 1 gm/ Sodium (Chloride)  250 mls @ 166 mls/hr IV Q8H [facility]


   Last Admin: 12/24/17 08:06 Dose:  166 mls/hr


Potassium Chloride 40 meq/ (Sodium Chloride)  500 mls @ 125 mls/hr IV ONETIME 

ONE


   Stop: 12/23/17 07:59


   Last Admin: 12/23/17 04:49 Dose:  125 mls/hr


Lactated Ringer's (Ringers, Lactated)  1,000 mls @ 999 mls/hr IV .BOLUS ONE


   Stop: 12/23/17 06:55


   Last Admin: 12/23/17 06:21 Dose:  999 mls/hr


Norepinephrine Bitartrate (Norepinephr-0.9% Nacl 4 Mg/250)  4 mg in 250 mls @ 

7.5 mls/hr IV TITRATE [facility]; 2 MCG/MIN


   PRN Reason: Protocol


   Last Admin: 12/24/17 08:10 Dose:  3 mcg/min, 11.25 mls/hr


Norepinephrine Bitartrate (Norepinephr-0.9% Nacl 4 Mg/250) Confirm Administered 

Dose 4 mg in 250 mls @ as directed IV .STK-MED ONE


   Stop: 12/23/17 07:56


   Last Admin: 12/23/17 08:40 Dose:  Not Given


Vasopressin 100 units/ Sodium (Chloride)  100 mls @ 0.6 mls/hr IV TITRATE [facility]; 

0.01 UNITS/MIN


   PRN Reason: Protocol


   Last Admin: 12/23/17 08:39 Dose:  0.033 units/min, 1.98 mls/hr


Meropenem 2 gm/ Sodium (Chloride)  100 mls @ 25 mls/hr IV Q8H [facility]


   Last Admin: 12/24/17 00:34 Dose:  25 mls/hr


Lactated Ringer's (Ringers, Lactated)  1,000 mls @ 999 mls/hr IV .BOLUS ONE


   Stop: 12/23/17 16:26


   Last Admin: 12/23/17 15:32 Dose:  999 mls/hr


Lactated Ringer's (Ringers, Lactated)  1,000 mls @ 125 mls/hr IV ASDIRECTED [facility]


   Last Admin: 12/24/17 00:21 Dose:  125 mls/hr


Furosemide 10 mg/ Sodium (Chloride)  51 mls @ 100 mls/hr IV ONETIME ONE


   Stop: 12/25/17 09:45


Sodium Chloride (Normal Saline)  1,000 mls @ 100 mls/hr IV ASDIRECTED [facility]


   Last Admin: 12/25/17 09:48 Dose:  100 mls/hr


Iopamidol (Isovue Multipack-370 (76%))  100 ml IVPUSH ONETIME STA


   Stop: 12/22/17 16:05


   Last Admin: 12/22/17 16:05 Dose:  100 ml


Ketorolac Tromethamine (Toradol)  15 mg IVPUSH Q6H PRN


   PRN Reason: Abdominal Pain


   Stop: 12/27/17 20:29


Lidocaine (Xylocaine-Mpf 2%) Confirm Administered Dose 5 ml .ROUTE .STK-MED ONE


   Stop: 12/22/17 19:07


Meperidine HCl (Demerol)  25 mg IVPUSH ONETIME ONE


   Stop: 12/22/17 20:50


   Last Admin: 12/22/17 20:55 Dose:  25 mg


Meperidine HCl (Demerol) Confirm Administered Dose 25 mg .ROUTE .STK-MED ONE


   Stop: 12/22/17 20:51


   Last Admin: 12/22/17 22:14 Dose:  Not Given


Midazolam HCl (Versed 1 Mg/Ml) Confirm Administered Dose 2 mg .ROUTE .STK-MED 

ONE


   Stop: 12/22/17 19:08


Morphine Sulfate (Morphine)  2 mg IVPUSH ONETIME ONE


   Stop: 12/22/17 14:23


   Last Admin: 12/22/17 14:45 Dose:  2 mg


Morphine Sulfate (Morphine)  2 mg IVPUSH ONETIME ONE


   Stop: 12/22/17 17:37


   Last Admin: 12/22/17 17:56 Dose:  2 mg


Ondansetron HCl (Zofran)  4 mg IVPUSH Q6H PRN


   PRN Reason: Nausea/Vomiting


   Last Admin: 12/23/17 22:14 Dose:  4 mg


Phenol/Menthol (Chloraseptic Throat Spray) Confirm Administered Dose 177 ml 

.ROUTE .STK-MED ONE


   Stop: 12/24/17 14:13


   Last Admin: 12/24/17 17:43 Dose:  Not Given


Potassium Chloride (Potassium Chloride)  40 meq PO ONETIME ONE


   Stop: 12/25/17 08:51


   Last Admin: 12/25/17 09:49 Dose:  40 meq


Potassium Chloride (Klor-Con M20)  40 meq PO ONETIME ONE


   Stop: 12/26/17 07:35


   Last Admin: 12/26/17 08:18 Dose:  40 meq


Propofol (Diprivan  20 Ml) Confirm Administered Dose 400 mg .ROUTE .STK-MED ONE


   Stop: 12/22/17 19:08


Propofol (Diprivan  20 Ml) Confirm Administered Dose 200 mg .ROUTE .STK-MED ONE


   Stop: 12/22/17 19:43


Vancomycin HCl (Vancocin) Confirm Administered Dose 1 gm .ROUTE .STK-MED ONE


   Stop: 12/22/17 19:34


   Last Admin: 12/22/17 22:14 Dose:  Not Given











- Exam


Quality Assessment: Reports: Supplemental Oxygen


General: Reports: Alert, Oriented


HEENT: Reports: Pupils Equal


Lungs: Reports: Normal Respiratory Effort


Cardiovascular: Reports: Regular Rate


GI/Abdominal Exam: Soft, Non-Tender, No Distention, No Mass, Other (Wound has 

granulation tissue to base and no longer has any cellulitis around it )





*Q Meaningful Use (DIS)





- VTE *Q


VTE Criteria *Q: 








- Stroke *Q


Stroke Criteria *Q: 








- AMI *Q


AMI Criteria *Q:

## 2018-03-05 ENCOUNTER — HOSPITAL ENCOUNTER (EMERGENCY)
Dept: HOSPITAL 56 - MW.ED | Age: 28
LOS: 1 days | Discharge: HOME | End: 2018-03-06
Payer: COMMERCIAL

## 2018-03-05 DIAGNOSIS — F41.9: ICD-10-CM

## 2018-03-05 DIAGNOSIS — Z88.6: ICD-10-CM

## 2018-03-05 DIAGNOSIS — Z88.2: ICD-10-CM

## 2018-03-05 DIAGNOSIS — Z88.8: ICD-10-CM

## 2018-03-05 DIAGNOSIS — Z88.0: ICD-10-CM

## 2018-03-05 DIAGNOSIS — M94.0: Primary | ICD-10-CM

## 2018-03-05 DIAGNOSIS — F17.210: ICD-10-CM

## 2018-03-05 LAB
CHLORIDE SERPL-SCNC: 104 MMOL/L (ref 98–107)
SODIUM SERPL-SCNC: 139 MMOL/L (ref 136–145)

## 2018-03-05 PROCEDURE — G0480 DRUG TEST DEF 1-7 CLASSES: HCPCS

## 2018-03-05 PROCEDURE — 80305 DRUG TEST PRSMV DIR OPT OBS: CPT

## 2018-03-05 PROCEDURE — 81001 URINALYSIS AUTO W/SCOPE: CPT

## 2018-03-05 PROCEDURE — 83690 ASSAY OF LIPASE: CPT

## 2018-03-05 PROCEDURE — 99285 EMERGENCY DEPT VISIT HI MDM: CPT

## 2018-03-05 PROCEDURE — 71045 X-RAY EXAM CHEST 1 VIEW: CPT

## 2018-03-05 PROCEDURE — 96374 THER/PROPH/DIAG INJ IV PUSH: CPT

## 2018-03-05 PROCEDURE — 84484 ASSAY OF TROPONIN QUANT: CPT

## 2018-03-05 PROCEDURE — 36415 COLL VENOUS BLD VENIPUNCTURE: CPT

## 2018-03-05 PROCEDURE — 85025 COMPLETE CBC W/AUTO DIFF WBC: CPT

## 2018-03-05 PROCEDURE — 87086 URINE CULTURE/COLONY COUNT: CPT

## 2018-03-05 PROCEDURE — 83880 ASSAY OF NATRIURETIC PEPTIDE: CPT

## 2018-03-05 PROCEDURE — 93005 ELECTROCARDIOGRAM TRACING: CPT

## 2018-03-05 PROCEDURE — 80053 COMPREHEN METABOLIC PANEL: CPT

## 2018-03-05 PROCEDURE — 96375 TX/PRO/DX INJ NEW DRUG ADDON: CPT

## 2018-03-05 PROCEDURE — 81025 URINE PREGNANCY TEST: CPT

## 2018-03-05 PROCEDURE — 96361 HYDRATE IV INFUSION ADD-ON: CPT

## 2018-03-05 NOTE — EDM.PDOC
ED HPI GENERAL MEDICAL PROBLEM





- General


Chief Complaint: Chest Pain


Stated Complaint: CHEST PAIN


Time Seen by Provider: 03/05/18 22:05


Source of Information: Reports: Patient





- History of Present Illness


INITIAL COMMENTS - FREE TEXT/NARRATIVE: 





HISTORY AND PHYSICAL:


History of present illness:


[





Patient with history of lupus and untreated anxiety presents with chest pain 8 

out of 10 for 2 weeks there is no associated shortness of breath or diaphoresis 

no radiation to arm neck or jaw





I can reproduce some symptoms with palpation over left sternal border, she also 

states pain worsens supine and leaning forward, her main complaint is that of 

palpitations. I did provide a liter normal saline bolus on arrival as well as 

Ativan 1 mg IV palpitations stopped she has no EKG evidence of pericarditis and 

is in no distress whatsoever.





She denies fever nausea vomiting chills sweats no shortness of breath headache 

dizziness bowel or urine symptoms











]


Review of systems: 


As per history of present illness and below otherwise all systems reviewed and 

negative.


Past medical history: 


As per history of present illness and as reviewed below otherwise 

noncontributory.


Surgical history: 


As per history of present illness and as reviewed below otherwise 

noncontributory.


Social history: 


No reported history of drug or alcohol abuse.


Family history: 


As per history of present illness and as reviewed below otherwise 

noncontributory.








Physical exam:


HEENT: Atraumatic, normocephalic, pupils reactive, negative for conjunctival 

pallor or scleral icterus, mucous membranes moist, throat clear, neck supple, 

nontender, trachea midline.


Lungs: Clear to auscultation, breath sounds equal bilaterally, chest tender 

along the left sternal border


Heart: S1S2, regular, 2/6 systolic murmur previously known about by patient


Abdomen: Soft, nondistended, nontender. Negative for masses or 

hepatosplenomegaly. Negative for costovertebral tenderness.


Pelvis: Stable nontender.


Genitourinary: Deferred.


Rectal: Deferred.


Extremities: Atraumatic, negative for cords or calf pain. Neurovascular 

unremarkable.


Neuro: Awake, alert, oriented. Cranial nerves II through XII unremarkable. 

Cerebellum unremarkable. Motor and sensory unremarkable throughout. Exam 

nonfocal.








Diagnostics:


[CBC CMP UA troponin amylase hCG


EKG


Chest 1 view


Drug screen, alcohol level


]


Therapeutics:


[1 L normal saline bolus]


Solu-Medrol 125 mg IV provided





Medrol Dosepak prescribed as patient is unable to tolerate NSAIDs due to lupus


Ativan 0.5 mg by mouth twice a day #10 no refill


Return if symptoms persist or worsen


Patient offered observation admission but refused stating she would like to go 

home, she has followed through [place] with a traveling cardiologist in the 

past she is willing to follow-up with our cardiologist for continued management 

but refuses observation admission,  risks and benefits described,.  patient is 

stable and essentially asymptomatic at current.











Impression: 


Costochondritis


Symptoms of pericarditis however no EKG change


[]Chest pain improved


Anxiety about health


Chronic history of baseline





Definitive disposition and diagnosis as appropriate pending reevaluation and 

review of above.


  ** chest


Pain Score (Numeric/FACES): 7





- Related Data


 Allergies











Allergy/AdvReac Type Severity Reaction Status Date / Time


 


aspirin Allergy  Blisters Verified 03/05/18 23:38


 


naproxen Allergy  Rash Verified 12/22/17 14:06


 


NSAIDS (Non-Steroidal Allergy  Blisters Verified 03/05/18 23:38





Anti-Inflamma     


 


Penicillins Allergy  Rash Verified 12/22/17 14:06


 


Sulfa (Sulfonamide Allergy  Difficulty Verified 12/22/17 14:06





Antibiotics)   Breathing  











Home Meds: 


 Home Meds





Hydroxychloroquine [Plaquenil] 200 mg PO BID 03/05/18 [History]











Past Medical History


HEENT History: Reports: Impaired Vision, Other (See Below)


Other HEENT History: enlarged tonsil


Cardiovascular History: Reports: None


Respiratory History: Reports: Other (See Below) (Smoker)


Gastrointestinal History: Reports: None


Genitourinary History: Reports: UTI, Recurrent


OB/GYN History: Reports: Pregnancy


Other OB/BYN History: Two prior pregnancies. Both children alive and healthy.


Musculoskeletal History: Reports: None


Neurological History: Reports: None


Psychiatric History: Reports: Anxiety


Endocrine/Metabolic History: Reports: None


Hematologic History: Reports: None


Immunologic History: Reports: Other (See Below)


Oncologic (Cancer) History: Reports: None


Dermatologic History: Reports: Other (See Below)


Other Dermatologic History: hand and feet rashes from Naproxen from Lupus





- Infectious Disease History


Infectious Disease History: Reports: Chicken Pox





- Past Surgical History


HEENT Surgical History: Reports: Other (See Below)


Female  Surgical History: Reports: None


Dermatological Surgical History: Reports: Skin Biopsy





Social & Family History





- Family History


Family Medical History: Noncontributory


Other Oncologic Family History: Father and maternal grandfather had cancer but 

patient cannot recall which kind.





- Tobacco Use


Smoking Status *Q: Current Every Day Smoker


Years of Tobacco use: 5


Packs/Tins Daily: 0.3


Used Tobacco, but Quit: No


Month Tobacco Last Used: November 2015


Second Hand Smoke Exposure: Yes





- Caffeine Use


Caffeine Use: Reports: Soda


Caffeine Use Comment: 2 cups per day





- Recreational Drug Use


Recreational Drug Use: No


Drug Use in Last 12 Months: Yes





ED ROS GENERAL





- Review of Systems


Review Of Systems: ROS reveals no pertinent complaints other than HPI.





ED EXAM, GENERAL





- Physical Exam


Exam: See Below





Course





- Vital Signs


Last Recorded V/S: 


 Last Vital Signs











Temp  98.0 F   03/05/18 23:53


 


Pulse  71   03/05/18 23:53


 


Resp  14   03/05/18 23:53


 


BP  101/57 L  03/05/18 23:53


 


Pulse Ox  99   03/05/18 23:53














- Orders/Labs/Meds


Orders: 


 Active Orders 24 hr











 Category Date Time Status


 


 EKG Documentation Completion [RC] STAT Care  03/05/18 22:05 Active


 


 EKG Documentation Completion [RC] STAT Care  03/05/18 23:29 Active


 


 Chest 1V Frontal [CR] Stat Exams  03/05/18 22:05 Taken


 


 B-TYPE NATRIURETIC PEPTIDE,BNP [CHEM] Stat Lab  03/05/18 22:10 Received


 


 CULTURE URINE [RM] Stat Lab  03/05/18 22:10 Received











Labs: 


 Laboratory Tests











  03/05/18 03/05/18 03/05/18 Range/Units





  22:10 22:10 22:10 


 


WBC  7.60    (4.0-11.0)  K/uL


 


RBC  3.85 L    (4.30-5.90)  M/uL


 


Hgb  10.9 L    (12.0-16.0)  g/dL


 


Hct  33.4 L    (36.0-46.0)  %


 


MCV  86.8    (80.0-98.0)  fL


 


MCH  28.3    (27.0-32.0)  pg


 


MCHC  32.6    (31.0-37.0)  g/dL


 


RDW Std Deviation  45.8    (28.0-62.0)  fl


 


RDW Coeff of Hung  15    (11.0-15.0)  %


 


Plt Count  247    (150-400)  K/uL


 


MPV  9.90    (7.40-12.00)  fL


 


Neut % (Auto)  64.1    (48.0-80.0)  %


 


Lymph % (Auto)  25.9    (16.0-40.0)  %


 


Mono % (Auto)  6.7    (0.0-15.0)  %


 


Eos % (Auto)  2.9    (0.0-7.0)  %


 


Baso % (Auto)  0.4    (0.0-1.5)  %


 


Neut # (Auto)  4.9    (1.4-5.7)  K/uL


 


Lymph # (Auto)  2.0    (0.6-2.4)  K/uL


 


Mono # (Auto)  0.5    (0.0-0.8)  K/uL


 


Eos # (Auto)  0.2    (0.0-0.7)  K/uL


 


Baso # (Auto)  0.0    (0.0-0.1)  K/uL


 


Nucleated RBC %  0.0    /100WBC


 


Nucleated RBCs #  0    K/uL


 


Sodium     (136-145)  mmol/L


 


Potassium     (3.5-5.1)  mmol/L


 


Chloride     ()  mmol/L


 


Carbon Dioxide     (21.0-32.0)  mmol/L


 


BUN     (7.0-18.0)  mg/dL


 


Creatinine     (0.6-1.0)  mg/dL


 


Est Cr Clr Drug Dosing     mL/min


 


Estimated GFR (MDRD)     ml/min


 


Glucose     ()  mg/dL


 


Calcium     (8.5-10.1)  mg/dL


 


Total Bilirubin     (0.2-1.0)  mg/dL


 


AST     (15-37)  U/L


 


ALT     (14-63)  U/L


 


Alkaline Phosphatase     ()  U/L


 


Troponin I     (0.000-0.056)  ng/mL


 


Total Protein     (6.4-8.2)  g/dL


 


Albumin     (3.4-5.0)  g/dL


 


Globulin     (2.0-3.5)  g/dL


 


Albumin/Globulin Ratio     (1.3-2.8)  


 


Lipase     ()  U/L


 


Urine Color   YELLOW   


 


Urine Appearance   HAZY   


 


Urine pH   6.0   (5.0-8.0)  


 


Ur Specific Gravity   >= 1.030   (1.001-1.035)  


 


Urine Protein   TRACE   (NEGATIVE)  mg/dL


 


Urine Glucose (UA)   NEGATIVE   (NEGATIVE)  mg/dL


 


Urine Ketones   NEGATIVE   (NEGATIVE)  mg/dL


 


Urine Occult Blood   LARGE H   (NEGATIVE)  


 


Urine Nitrite   NEGATIVE   (NEGATIVE)  


 


Urine Bilirubin   NEGATIVE   (NEGATIVE)  


 


Urine Urobilinogen   0.2   (<2.0)  EU/dL


 


Ur Leukocyte Esterase   TRACE   (NEGATIVE)  


 


Urine RBC   25-30   (0-2/HPF)  


 


Urine WBC   5-7   (0-5/HPF)  


 


Ur Epithelial Cells   FEW   (NONE-FEW)  


 


Urine Bacteria   FEW   (NEGATIVE)  


 


Urine HCG, Qual    NEGATIVE  (NEGATIVE)  


 


Urine Opiates Screen     (NEGATIVE)  


 


Ur Oxycodone Screen     (NEGATIVE)  


 


Urine Methadone Screen     (NEGATIVE)  


 


Ur Barbiturates Screen     (NEGATIVE)  


 


Ur Phencyclidine Scrn     (NEGATIVE)  


 


Ur Amphetamine Screen     (NEGATIVE)  


 


U Methamphetamines Scrn     (NEGATIVE)  


 


U Benzodiazepines Scrn     (NEGATIVE)  


 


U Cocaine Metab Screen     (NEGATIVE)  


 


U Marijuana (THC) Screen     (NEGATIVE)  


 


Ethyl Alcohol     mg/dL














  03/05/18 03/05/18 Range/Units





  22:10 22:10 


 


WBC    (4.0-11.0)  K/uL


 


RBC    (4.30-5.90)  M/uL


 


Hgb    (12.0-16.0)  g/dL


 


Hct    (36.0-46.0)  %


 


MCV    (80.0-98.0)  fL


 


MCH    (27.0-32.0)  pg


 


MCHC    (31.0-37.0)  g/dL


 


RDW Std Deviation    (28.0-62.0)  fl


 


RDW Coeff of Hung    (11.0-15.0)  %


 


Plt Count    (150-400)  K/uL


 


MPV    (7.40-12.00)  fL


 


Neut % (Auto)    (48.0-80.0)  %


 


Lymph % (Auto)    (16.0-40.0)  %


 


Mono % (Auto)    (0.0-15.0)  %


 


Eos % (Auto)    (0.0-7.0)  %


 


Baso % (Auto)    (0.0-1.5)  %


 


Neut # (Auto)    (1.4-5.7)  K/uL


 


Lymph # (Auto)    (0.6-2.4)  K/uL


 


Mono # (Auto)    (0.0-0.8)  K/uL


 


Eos # (Auto)    (0.0-0.7)  K/uL


 


Baso # (Auto)    (0.0-0.1)  K/uL


 


Nucleated RBC %    /100WBC


 


Nucleated RBCs #    K/uL


 


Sodium  139   (136-145)  mmol/L


 


Potassium  4.0   (3.5-5.1)  mmol/L


 


Chloride  104   ()  mmol/L


 


Carbon Dioxide  27.6   (21.0-32.0)  mmol/L


 


BUN  14   (7.0-18.0)  mg/dL


 


Creatinine  0.7   (0.6-1.0)  mg/dL


 


Est Cr Clr Drug Dosing  117.73   mL/min


 


Estimated GFR (MDRD)  > 60.0   ml/min


 


Glucose  93   ()  mg/dL


 


Calcium  8.7   (8.5-10.1)  mg/dL


 


Total Bilirubin  0.2   (0.2-1.0)  mg/dL


 


AST  13 L   (15-37)  U/L


 


ALT  15   (14-63)  U/L


 


Alkaline Phosphatase  53   ()  U/L


 


Troponin I  < 0.050   (0.000-0.056)  ng/mL


 


Total Protein  8.5 H   (6.4-8.2)  g/dL


 


Albumin  3.3 L   (3.4-5.0)  g/dL


 


Globulin  5.2 H   (2.0-3.5)  g/dL


 


Albumin/Globulin Ratio  0.6 L   (1.3-2.8)  


 


Lipase  102   ()  U/L


 


Urine Color    


 


Urine Appearance    


 


Urine pH    (5.0-8.0)  


 


Ur Specific Gravity    (1.001-1.035)  


 


Urine Protein    (NEGATIVE)  mg/dL


 


Urine Glucose (UA)    (NEGATIVE)  mg/dL


 


Urine Ketones    (NEGATIVE)  mg/dL


 


Urine Occult Blood    (NEGATIVE)  


 


Urine Nitrite    (NEGATIVE)  


 


Urine Bilirubin    (NEGATIVE)  


 


Urine Urobilinogen    (<2.0)  EU/dL


 


Ur Leukocyte Esterase    (NEGATIVE)  


 


Urine RBC    (0-2/HPF)  


 


Urine WBC    (0-5/HPF)  


 


Ur Epithelial Cells    (NONE-FEW)  


 


Urine Bacteria    (NEGATIVE)  


 


Urine HCG, Qual    (NEGATIVE)  


 


Urine Opiates Screen   NEGATIVE  (NEGATIVE)  


 


Ur Oxycodone Screen   POSITIVE  (NEGATIVE)  


 


Urine Methadone Screen   NEGATIVE  (NEGATIVE)  


 


Ur Barbiturates Screen   NEGATIVE  (NEGATIVE)  


 


Ur Phencyclidine Scrn   NEGATIVE  (NEGATIVE)  


 


Ur Amphetamine Screen   NEGATIVE  (NEGATIVE)  


 


U Methamphetamines Scrn   NEGATIVE  (NEGATIVE)  


 


U Benzodiazepines Scrn   NEGATIVE  (NEGATIVE)  


 


U Cocaine Metab Screen   NEGATIVE  (NEGATIVE)  


 


U Marijuana (THC) Screen   NEGATIVE  (NEGATIVE)  


 


Ethyl Alcohol  <3   mg/dL











Meds: 


Medications














Discontinued Medications














Generic Name Dose Route Start Last Admin





  Trade Name Britney  PRN Reason Stop Dose Admin


 


Sodium Chloride  1,000 mls @ 999 mls/hr  03/05/18 22:04  03/05/18 22:17





  Normal Saline  IV  03/05/18 23:04  999 mls/hr





  STAT ONE   Administration


 


Lorazepam  1 mg  03/05/18 22:10  03/05/18 22:17





  Ativan  IVPUSH  03/05/18 22:11  1 mg





  ONETIME ONE   Administration


 


Methylprednisolone Sodium Succinate  125 mg  03/05/18 23:22  03/05/18 23:31





  Solu-Medrol  IVPUSH  03/05/18 23:23  125 mg





  ONETIME ONE   Administration














Departure





- Departure


Time of Disposition: 00:25


Disposition: Home, Self-Care 01


Condition: Fair


Clinical Impression: 


 Atypical chest pain, Costochondritis








- Discharge Information


Referrals: 


Franko Angulo MD [Primary Care Provider] - 


Forms:  ED Department Discharge


Additional Instructions: 


Medication as prescribed


Return if symptoms persist or worsen


ER referral for cardiology with Dr. DUTTA for this week


Follow-up with primary care as needed in the interim





[facility]


Primary Care - Non-Interventional Cardiology


[address] [place] [address]


Phone: [phone]


Fax: [phone]








The following information is given to patients seen in the emergency department 

who are being discharged to home. This information is to outline your options 

for follow-up care. We provide all patients seen in our emergency department 

with a follow-up referral.





The need for follow-up, as well as the timing and circumstances, are variable 

depending upon the specifics of your emergency department visit.





If you don't have a primary care physician on staff, we will provide you with a 

referral. We always advise you to contact your personal physician following an 

emergency department visit to inform them of the circumstance of the visit and 

for follow-up with them and/or the need for any referrals to a consulting 

specialist.





The emergency department will also refer you to a specialist when appropriate. 

This referral assures that you have the opportunity for follow-up care with a 

specialist. All of these measure are taken in an effort to provide you with 

optimal care, which includes your follow-up.





Under all circumstances we always encourage you to contact your private 

physician who remains a resource for coordinating your care. When calling for 

follow-up care, please make the office aware that this follow-up is from your 

recent emergency room visit. If for any reason you are refused follow-up, 

please contact the [facility] emergency department at [phone] 

and asked to speak to the emergency department charge nurse.











- My Orders


Last 24 Hours: 


My Active Orders





03/05/18 22:05


EKG Documentation Completion [RC] STAT 


Chest 1V Frontal [CR] Stat 





03/05/18 22:10


B-TYPE NATRIURETIC PEPTIDE,BNP [CHEM] Stat 


CULTURE URINE [RM] Stat 





03/05/18 23:29


EKG Documentation Completion [RC] STAT 














- Assessment/Plan


Last 24 Hours: 


My Active Orders





03/05/18 22:05


EKG Documentation Completion [RC] STAT 


Chest 1V Frontal [CR] Stat 





03/05/18 22:10


B-TYPE NATRIURETIC PEPTIDE,BNP [CHEM] Stat 


CULTURE URINE [RM] Stat 





03/05/18 23:29


EKG Documentation Completion [RC] STAT

## 2018-03-06 VITALS — SYSTOLIC BLOOD PRESSURE: 113 MMHG | DIASTOLIC BLOOD PRESSURE: 59 MMHG

## 2018-03-06 NOTE — CR
EXAM DATE: 18



PATIENT'S AGE: 27





Patient: KINA CARVALHO



Facility: [place], [place]

Patient ID: [id number]

Site Patient ID: [id number].

Site Accession #: [id number]

: 1990

Study: XRay Chest [id number]/5/2018 10:42:24 PM

Ordering Physician: Doctor Temp



Final Report: 

INDICATION: palpitations



TECHNIQUE:

Chest 1 view. 



COMPARISON:

17 



FINDINGS:

Cardiovascular and mediastinum: Heart size and vasculature are normal in 
caliber and appearance. Mediastinum is within normal limits. 



Lungs and pleural space: Lungs are clear. No sign of infiltrate or mass. No 
sign of pleural effusion. No pneumothorax. 



Bones and soft tissues: No significant findings. 



IMPRESSION:

Unremarkable chest.





Dictated by: Aurelio Anderson MD @ 2018 22:47:09

(Electronic Signature)



Report Signed by Proxy.
DIANNA

## 2018-03-20 ENCOUNTER — HOSPITAL ENCOUNTER (EMERGENCY)
Dept: HOSPITAL 56 - MW.ED | Age: 28
Discharge: HOME | End: 2018-03-20
Payer: COMMERCIAL

## 2018-03-20 VITALS — DIASTOLIC BLOOD PRESSURE: 47 MMHG | SYSTOLIC BLOOD PRESSURE: 93 MMHG

## 2018-03-20 DIAGNOSIS — F17.210: ICD-10-CM

## 2018-03-20 DIAGNOSIS — Z88.2: ICD-10-CM

## 2018-03-20 DIAGNOSIS — Z88.6: ICD-10-CM

## 2018-03-20 DIAGNOSIS — L29.9: ICD-10-CM

## 2018-03-20 DIAGNOSIS — Z88.8: ICD-10-CM

## 2018-03-20 DIAGNOSIS — N39.0: Primary | ICD-10-CM

## 2018-03-20 DIAGNOSIS — Z79.899: ICD-10-CM

## 2018-03-20 DIAGNOSIS — Z88.0: ICD-10-CM

## 2018-03-20 PROCEDURE — 99283 EMERGENCY DEPT VISIT LOW MDM: CPT

## 2018-03-20 PROCEDURE — 87086 URINE CULTURE/COLONY COUNT: CPT

## 2018-03-20 PROCEDURE — 87081 CULTURE SCREEN ONLY: CPT

## 2018-03-20 PROCEDURE — 81001 URINALYSIS AUTO W/SCOPE: CPT

## 2018-03-20 PROCEDURE — 87880 STREP A ASSAY W/OPTIC: CPT

## 2018-03-20 NOTE — EDM.PDOC
ED HPI GENERAL MEDICAL PROBLEM





- General


Chief Complaint: Skin Complaint


Stated Complaint: RASH ALL OVER BODY


Time Seen by Provider: 03/20/18 08:16


Source of Information: Reports: Patient





- History of Present Illness


INITIAL COMMENTS - FREE TEXT/NARRATIVE: 





HISTORY AND PHYSICAL:


History of present illness:


[Patient recently seen for chest pain





She was provided a Medrol Dosepak at that time and Ativan, chest pain symptoms 

then resolved she has no symptoms such as fever nausea vomiting diarrhea 

constipation chest pain shortness breath headache dizziness or palpitation no 

bowel or urine symptoms





She complains of rash in her right as she states that she had a fever of up to 

102 for a couple of days developing rash now she has itching involving arms 

legs and trunk however there is no rash present at current patient has been 

taking Benadryl 2 tabs daily without benefit





]


Review of systems: 


As per history of present illness and below otherwise all systems reviewed and 

negative.


Past medical history: 


As per history of present illness and as reviewed below otherwise 

noncontributory.


Surgical history: 


As per history of present illness and as reviewed below otherwise 

noncontributory.


Social history: 


No reported history of drug or alcohol abuse.


Family history: 


As per history of present illness and as reviewed below otherwise 

noncontributory.








Physical exam:


HEENT: Atraumatic, normocephalic, pupils reactive, negative for conjunctival 

pallor or scleral icterus, mucous membranes moist, throat clear, neck supple, 

nontender, trachea midline.


Lungs: Clear to auscultation, breath sounds equal bilaterally, chest nontender.


Heart: S1S2, regular, negative for clicks, rubs, or JVD.


Abdomen: Soft, nondistended, nontender. Negative for masses or 

hepatosplenomegaly. Negative for costovertebral tenderness.


Pelvis: Stable nontender.


Genitourinary: Deferred.


Rectal: Deferred.


Extremities: Atraumatic, negative for cords or calf pain. Neurovascular 

unremarkable.


Neuro: Awake, alert, oriented. Cranial nerves II through XII unremarkable. 

Cerebellum unremarkable. Motor and sensory unremarkable throughout. Exam 

nonfocal.





Skin unremarkable








Diagnostics:


[]Strep


UA


CBC CMP EKG chest on file from last week


Therapeutics:


[Castano 150 mg by mouth now





Continue Benadryl


Zantac


Prednisone 40 mg by mouth daily 3 days





]


Impression: 





UTI


puritis


Blood pressure stable compared to previous


Chronic history  at baseline


Definitive disposition and diagnosis as appropriate pending reevaluation and 

review of above.


  ** Generalized


Pain Score (Numeric/FACES): 4





- Related Data


 Allergies











Allergy/AdvReac Type Severity Reaction Status Date / Time


 


aspirin Allergy  Blisters Verified 03/05/18 23:38


 


naproxen Allergy  Rash Verified 12/22/17 14:06


 


NSAIDS (Non-Steroidal Allergy  Blisters Verified 03/05/18 23:38





Anti-Inflamma     


 


Penicillins Allergy  Rash Verified 12/22/17 14:06


 


Sulfa (Sulfonamide Allergy  Difficulty Verified 12/22/17 14:06





Antibiotics)   Breathing  











Home Meds: 


 Home Meds





Hydroxychloroquine [Plaquenil] 200 mg PO BID 03/05/18 [History]











Past Medical History


HEENT History: Reports: Impaired Vision, Other (See Below)


Other HEENT History: enlarged tonsil


Cardiovascular History: Reports: None


Respiratory History: Reports: Other (See Below)


Gastrointestinal History: Reports: None


Genitourinary History: Reports: UTI, Recurrent


OB/GYN History: Reports: Pregnancy


Other OB/BYN History: Two prior pregnancies. Both children alive and healthy.


Musculoskeletal History: Reports: None


Neurological History: Reports: None


Psychiatric History: Reports: Anxiety


Endocrine/Metabolic History: Reports: None


Hematologic History: Reports: None


Immunologic History: Reports: Other (See Below)


Oncologic (Cancer) History: Reports: None


Dermatologic History: Reports: Other (See Below)


Other Dermatologic History: hand and feet rashes from Naproxen from Lupus





- Infectious Disease History


Infectious Disease History: Reports: Chicken Pox





- Past Surgical History


HEENT Surgical History: Reports: Other (See Below)


Female  Surgical History: Reports: None


Dermatological Surgical History: Reports: Skin Biopsy





Social & Family History





- Family History


Family Medical History: Noncontributory


Other Oncologic Family History: Father and maternal grandfather had cancer but 

patient cannot recall which kind.





- Tobacco Use


Smoking Status *Q: Current Every Day Smoker


Years of Tobacco use: 10


Packs/Tins Daily: 0.5


Used Tobacco, but Quit: No


Month/Year Tobacco Last Used: November 2015


Second Hand Smoke Exposure: Yes





- Caffeine Use


Caffeine Use: Reports: Coffee, Soda


Caffeine Use Comment: 2 cups per day





- Recreational Drug Use


Recreational Drug Use: No


Drug Use in Last 12 Months: Yes





ED ROS GENERAL





- Review of Systems


Review Of Systems: ROS reveals no pertinent complaints other than HPI.





ED EXAM, SKIN/RASH


Exam: See Below





Course





- Vital Signs


Last Recorded V/S: 


 Last Vital Signs











Temp  97.7 F   03/20/18 08:03


 


Pulse  104 H  03/20/18 08:03


 


Resp  18   03/20/18 08:03


 


BP  99/56 L  03/20/18 08:03


 


Pulse Ox  98   03/20/18 08:03














- Orders/Labs/Meds


Orders: 


 Active Orders 24 hr











 Category Date Time Status


 


 CULTURE STREP A CONFIRMATION [] Stat Lab  03/20/18 08:15 Results


 


 CULTURE URINE [] Stat Lab  03/20/18 08:58 Ordered


 


 STREP SCRN A RAPID W CULT CONF [] Stat Lab  03/20/18 08:15 Results











Labs: 


 Laboratory Tests











  03/20/18 Range/Units





  08:15 


 


Urine Color  YELLOW  


 


Urine Appearance  SLT CLOUDY  


 


Urine pH  6.0  (5.0-8.0)  


 


Ur Specific Gravity  1.025  (1.001-1.035)  


 


Urine Protein  100  (NEGATIVE)  mg/dL


 


Urine Glucose (UA)  NEGATIVE  (NEGATIVE)  mg/dL


 


Urine Ketones  TRACE H  (NEGATIVE)  mg/dL


 


Urine Occult Blood  SMALL H  (NEGATIVE)  


 


Urine Nitrite  NEGATIVE  (NEGATIVE)  


 


Urine Bilirubin  SMALL H  (NEGATIVE)  


 


Urine Urobilinogen  0.2  (<2.0)  EU/dL


 


Ur Leukocyte Esterase  MODERATE  (NEGATIVE)  


 


Urine RBC  0-2  (0-2/HPF)  


 


Urine WBC  2-4  (0-5/HPF)  


 


Ur Epithelial Cells  FEW  (NONE-FEW)  


 


Amorphous Sediment  FEW  (NEGATIVE)  


 


Urine Bacteria  FEW  (NEGATIVE)  











Meds: 


Medications














Discontinued Medications














Generic Name Dose Route Start Last Admin





  Trade Name Britney  PRN Reason Stop Dose Admin


 


Famotidine  20 mg  03/20/18 08:45  





  Pepcid  PO  03/20/18 08:46  





  NOW ONE   


 


Levofloxacin  500 mg  03/20/18 08:57  





  Levaquin  PO  03/20/18 08:58  





  ONETIME ONE   














Departure





- Departure


Time of Disposition: 09:00


Disposition: Home, Self-Care 01


Condition: Good


Clinical Impression: 


 UTI (urinary tract infection), Chronic pruritic rash in adult








- Discharge Information


Referrals: 


Aurelio Seth MD [Primary Care Provider] - 


Forms:  ED Department Discharge


Additional Instructions: 


Medication as prescribed


Benadryl 50 mg every 6 hours may benefit


Zantac 150 milligrams by mouth twice a day can be used in conjunction with 

Benadryl


A prednisone burst is provided for 3 days


Return if symptoms persist or worsen or new concerning symptoms develop





Follow-up with primary care in 2 weeks sooner as needed consider allergy testing





The following information is given to patients seen in the emergency department 

who are being discharged to home. This information is to outline your options 

for follow-up care. We provide all patients seen in our emergency department 

with a follow-up referral.





The need for follow-up, as well as the timing and circumstances, are variable 

depending upon the specifics of your emergency department visit.





If you don't have a primary care physician on staff, we will provide you with a 

referral. We always advise you to contact your personal physician following an 

emergency department visit to inform them of the circumstance of the visit and 

for follow-up with them and/or the need for any referrals to a consulting 

specialist.





The emergency department will also refer you to a specialist when appropriate. 

This referral assures that you have the opportunity for follow-up care with a 

specialist. All of these measure are taken in an effort to provide you with 

optimal care, which includes your follow-up.





Under all circumstances we always encourage you to contact your private 

physician who remains a resource for coordinating your care. When calling for 

follow-up care, please make the office aware that this follow-up is from your 

recent emergency room visit. If for any reason you are refused follow-up, 

please contact the [facility] emergency department at [phone] 

and asked to speak to the emergency department charge nurse.








- My Orders


Last 24 Hours: 


My Active Orders





03/20/18 08:15


CULTURE STREP A CONFIRMATION [RM] Stat 


STREP SCRN A RAPID W CULT CONF [RM] Stat 





03/20/18 08:58


CULTURE URINE [RM] Stat 














- Assessment/Plan


Last 24 Hours: 


My Active Orders





03/20/18 08:15


CULTURE STREP A CONFIRMATION [RM] Stat 


STREP SCRN A RAPID W CULT CONF [RM] Stat 





03/20/18 08:58


CULTURE URINE [RM] Stat

## 2018-05-31 NOTE — PCM.SN
- Free Text/Narrative


Note: 


Pt was discharged to from PACU to ICU for further monitoring.  Pt had developed 

high fever in PACU up to 104.5 F, HR  in PACU, with marginal blood 

pressures mid  SBP.  The patient was alert and oriented the entire time.  

The patients baseline was 100-90's SBP pre-start of the the procedure.  MAC was 

performed so non-triggering Malignant Hyperthermia agents were used, so that 

was excluded.  Benadryl and Demerol were given for severe shivering and 

Itching.  Temp of 104.5 F was reconfirmed in ICU, and 1 gram of IV Ofirmev was 

given by me on the patients arrival to ICU.  I have called Dr Hernandez to the 

bedside in ICU for further evaluation post-op.  CBC, BMP, and Lactic Acid were 

ordered.  BMP is still pending, at this time septic shock is suspected. 01-Jun-2018 01-Jun-2018 07:30 01-Jun-2018 08:50

## 2022-05-11 NOTE — [id number]
Post Anesthesia Note





- EVALUATION WITHIN 48HRS OF ANESTHETIC


Vital Signs in Normal Range: Yes (Levophed and Vasopressin drip currently)


Patient Participated in Evaluation: Yes


Respiratory Function Stable: Yes


Airway Patent: Yes


Cardiovascular Function Stable: Yes (Arterial line in place)


Hydration Status Stable: Yes (CVL line in place)


Pain Control Satisfactory: Yes


Nausea and Vomiting Control Satisfactory: Yes


Mental Status Recovered: Yes





- COMMENTS/OBSERVATIONS


Free Text/Narrative:: 


Pt has been stable throughout the night, levophed 7mcg/min and vasopressin 4 

units/hr.  Pt has also been very emotional throughout the night as well.
89
Female